# Patient Record
Sex: MALE | Race: WHITE | NOT HISPANIC OR LATINO | Employment: OTHER | URBAN - METROPOLITAN AREA
[De-identification: names, ages, dates, MRNs, and addresses within clinical notes are randomized per-mention and may not be internally consistent; named-entity substitution may affect disease eponyms.]

---

## 2017-01-18 ENCOUNTER — OFFICE VISIT (OUTPATIENT)
Dept: CARDIOLOGY | Facility: CLINIC | Age: 77
End: 2017-01-18

## 2017-01-18 VITALS
DIASTOLIC BLOOD PRESSURE: 93 MMHG | BODY MASS INDEX: 29.86 KG/M2 | SYSTOLIC BLOOD PRESSURE: 126 MMHG | WEIGHT: 208.6 LBS | HEIGHT: 70 IN | HEART RATE: 66 BPM

## 2017-01-18 DIAGNOSIS — E78.5 DYSLIPIDEMIA: ICD-10-CM

## 2017-01-18 DIAGNOSIS — R00.2 PALPITATIONS: Primary | ICD-10-CM

## 2017-01-18 DIAGNOSIS — I10 CHRONIC HYPERTENSION: ICD-10-CM

## 2017-01-18 PROCEDURE — 99213 OFFICE O/P EST LOW 20 MIN: CPT | Performed by: INTERNAL MEDICINE

## 2017-01-18 RX ORDER — PRAVASTATIN SODIUM 40 MG
TABLET ORAL DAILY
COMMUNITY
Start: 2016-12-30 | End: 2017-01-18 | Stop reason: SDUPTHER

## 2017-01-18 RX ORDER — DUTASTERIDE AND TAMSULOSIN HYDROCHLORIDE CAPSULES .5; .4 MG/1; MG/1
CAPSULE ORAL 3 TIMES WEEKLY
COMMUNITY
Start: 2016-10-26 | End: 2020-08-05

## 2017-01-18 RX ORDER — BISOPROLOL FUMARATE AND HYDROCHLOROTHIAZIDE 5; 6.25 MG/1; MG/1
1 TABLET ORAL DAILY
Qty: 90 TABLET | Refills: 2 | Status: SHIPPED | OUTPATIENT
Start: 2017-01-18 | End: 2017-10-27 | Stop reason: SDUPTHER

## 2017-01-18 RX ORDER — LISINOPRIL 20 MG/1
20 TABLET ORAL DAILY
Qty: 90 TABLET | Refills: 2 | Status: SHIPPED | OUTPATIENT
Start: 2017-01-18 | End: 2022-10-26

## 2017-01-18 RX ORDER — LISINOPRIL 20 MG/1
TABLET ORAL DAILY
COMMUNITY
Start: 2016-10-27 | End: 2017-01-18 | Stop reason: SDUPTHER

## 2017-01-18 RX ORDER — PRAVASTATIN SODIUM 40 MG
40 TABLET ORAL DAILY
Qty: 90 TABLET | Refills: 3 | Status: SHIPPED | OUTPATIENT
Start: 2017-01-18

## 2017-01-18 NOTE — PROGRESS NOTES
Subjective:     Encounter Date:01/18/2017      Patient ID: Casey Mathews is a 76 y.o. ., white male, retired , PhD, currently retired as a  for [employer], from [place], [place].    REFERRING/INTERNIST: Ulises Emanuel MD, [facility]  CURRENT PHYSICIAN:  Yakov Jansen MD  ORTHOPEDIC SURGEON: Fidencio Montejo MD     Chief Complaint:   Chief Complaint   Patient presents with   • Follow-up     Problem List:  1. Intermittent palpitations with associated tachypnea, dyspnea, weakness and fatigue:  a. Remote apparent similar symptoms with apparent “negative” stress test - data deficit, approximately 1990s.  b. Recent EKG demonstrating frequent PVCs, August 2011.  c. Acceptable echocardiogram demonstrating mild aortic valve sclerosis with mild AI and mild MR and mild right ventricular chamber enlargement, August 2011.   d. Recent abnormal acceptable echocardiogram with occasional PVCs and occasional unifocal and multifocal PVCs with occasional ventricular couplets and rare ventricular episodes with brief interventricular rhythm with acceptable Cardiolite GXT with exercise to 88% of predicted exercise capacity, LVEF (0.59), July 2014.  e. Residual class I symptoms on low-dose beta blocker drug therapy.  2. Chronic hypertension - probably essential.   3. Dyslipidemia.  4. Chronic lower tract obstructive symptoms - probable BPH.  5. Remote cataract extraction, 2001.  6. Recent left knee meniscal tear with arthroscopic surgery - data deficit, autumn 2015.    No Known Allergies      Current Outpatient Prescriptions:   •  bisoprolol-hydrochlorothiazide (ZIAC) 5-6.25 MG per tablet, TAKE ONE TABLET BY MOUTH ONCE DAILY, Disp: 90 tablet, Rfl: 2  •  dutasteride-tamsulosin (JANETTE) 0.5-0.4 MG capsule capsule, 3 (Three) Times a Week., Disp: , Rfl:   •  lisinopril (PRINIVIL,ZESTRIL) 20 MG tablet, Daily., Disp: , Rfl:   •  pravastatin (PRAVACHOL)  "40 MG tablet, Daily., Disp: , Rfl:     History of Present Illness  Patient denies any chest discomfort, shortness of breath, fatigue, edema, presyncope, syncope, palpitations.  He has lost weight since his last visit.  He has had his flu vaccination this year.  He is now retired as of November 2016 and is enjoying being able to travel and do things at leisure.    ROS   Obtained and otherwise negative except as outlined in problem list and HPI.    Procedures        Objective:       Vitals:    01/18/17 1412 01/18/17 1413   BP: 125/91 126/93   BP Location: Left arm Left arm   Patient Position: Sitting Standing   Pulse: 63 66   Weight:  208 lb 9.6 oz (94.6 kg)   Height:  70\" (177.8 cm)     Body mass index is 29.93 kg/(m^2).   Last weight January 2016 was 220 pounds    Physical Exam   Constitutional: He appears well-developed and well-nourished.   HENT:   Head: Normocephalic and atraumatic.   Mouth/Throat: Oropharynx is clear and moist.   Neck: Neck supple. No JVD present. Carotid bruit is not present. No thyromegaly present.   Cardiovascular: Normal rate and regular rhythm.  Exam reveals no gallop, no S3 and no friction rub.    No murmur heard.  Pulses:       Dorsalis pedis pulses are 2+ on the right side, and 2+ on the left side.        Posterior tibial pulses are 2+ on the right side, and 2+ on the left side.   Pulmonary/Chest: Effort normal and breath sounds normal.   Abdominal: Soft. He exhibits no mass. There is no hepatosplenomegaly. There is no tenderness.   Lymphadenopathy:     He has no cervical adenopathy.   Neurological: He is alert.   Skin: Skin is warm, dry and intact.       Lab Review: None to review          Assessment:       Overall continued acceptable course with no interim cardiopulmonary complaints with good functional status. We will defer additional diagnostic or therapeutic intervention from a cardiac perspective at this time. I feel his current regimen is producing excellent results, but " certainly if he is having progressive weight loss, he may need to down titrate on his bisoprolol dose to 2.5 mg daily.     Diagnosis Plan   1. Palpitations     2. Chronic hypertension     3. Dyslipidemia            Plan:         1. Patient to continue current medications and close follow up with the above providers.  2. Tentative cardiology follow up in 1 year or patient may return sooner PRN.  3. He has our 3-489 card and will fax his laboratory results to us.        Scribed for Audie Guevara MD by Krystal Valentin, ATIYA. 1/18/2017  2:23 PM    I, Audie Guevara MD, [facility], personally performed the services described in this documentation as scribed by the above named individual in my presence, and it is both accurate and complete. At 3:02 PM on 01/18/2017

## 2017-01-18 NOTE — LETTER
January 18, 2017     Yakov Jansen MD  [address] [place] [id number]    Patient: Casey Mathews   YOB: 1940   Date of Visit: 1/18/2017       Dear Dr. Inderjit MD:    Thank you for referring Casey Mathews to me for evaluation. Below are the relevant portions of my assessment and plan of care.    If you have questions, please do not hesitate to call me. I look forward to following Casey along with you.         Sincerely,        Audie Guevara MD        CC: No Recipients  Audie Guevara MD  1/18/2017  3:04 PM  Signed      Subjective:     Encounter Date:01/18/2017      Patient ID: Casey Mathews is a 76 y.o. ., white male, retired , PhD, currently retired as a  for [employer], from [place], [place].    REFERRING/INTERNIST: Ulises Emanuel MD, [facility]  CURRENT PHYSICIAN:  Yakov Jansen MD  ORTHOPEDIC SURGEON: Fidencio Montejo MD     Chief Complaint:   Chief Complaint   Patient presents with   • Follow-up     Problem List:  1. Intermittent palpitations with associated tachypnea, dyspnea, weakness and fatigue:  a. Remote apparent similar symptoms with apparent “negative” stress test - data deficit, approximately 1990s.  b. Recent EKG demonstrating frequent PVCs, August 2011.  c. Acceptable echocardiogram demonstrating mild aortic valve sclerosis with mild AI and mild MR and mild right ventricular chamber enlargement, August 2011.   d. Recent abnormal acceptable echocardiogram with occasional PVCs and occasional unifocal and multifocal PVCs with occasional ventricular couplets and rare ventricular episodes with brief interventricular rhythm with acceptable Cardiolite GXT with exercise to 88% of predicted exercise capacity, LVEF (0.59), July 2014.  e. Residual class I symptoms on low-dose beta blocker drug therapy.  2. Chronic hypertension - probably essential.   3. Dyslipidemia.  4. Chronic  "lower tract obstructive symptoms - probable BPH.  5. Remote cataract extraction, 2001.  6. Recent left knee meniscal tear with arthroscopic surgery - data deficit, autumn 2015.    No Known Allergies      Current Outpatient Prescriptions:   •  bisoprolol-hydrochlorothiazide (ZIAC) 5-6.25 MG per tablet, TAKE ONE TABLET BY MOUTH ONCE DAILY, Disp: 90 tablet, Rfl: 2  •  dutasteride-tamsulosin (JANETTE) 0.5-0.4 MG capsule capsule, 3 (Three) Times a Week., Disp: , Rfl:   •  lisinopril (PRINIVIL,ZESTRIL) 20 MG tablet, Daily., Disp: , Rfl:   •  pravastatin (PRAVACHOL) 40 MG tablet, Daily., Disp: , Rfl:     History of Present Illness  Patient denies any chest discomfort, shortness of breath, fatigue, edema, presyncope, syncope, palpitations.  He has lost weight since his last visit.  He has had his flu vaccination this year.  He is now retired as of November 2016 and is enjoying being able to travel and do things at leisure.    ROS   Obtained and otherwise negative except as outlined in problem list and HPI.    Procedures        Objective:       Vitals:    01/18/17 1412 01/18/17 1413   BP: 125/91 126/93   BP Location: Left arm Left arm   Patient Position: Sitting Standing   Pulse: 63 66   Weight:  208 lb 9.6 oz (94.6 kg)   Height:  70\" (177.8 cm)     Body mass index is 29.93 kg/(m^2).   Last weight January 2016 was 220 pounds    Physical Exam   Constitutional: He appears well-developed and well-nourished.   HENT:   Head: Normocephalic and atraumatic.   Mouth/Throat: Oropharynx is clear and moist.   Neck: Neck supple. No JVD present. Carotid bruit is not present. No thyromegaly present.   Cardiovascular: Normal rate and regular rhythm.  Exam reveals no gallop, no S3 and no friction rub.    No murmur heard.  Pulses:       Dorsalis pedis pulses are 2+ on the right side, and 2+ on the left side.        Posterior tibial pulses are 2+ on the right side, and 2+ on the left side.   Pulmonary/Chest: Effort normal and breath sounds " normal.   Abdominal: Soft. He exhibits no mass. There is no hepatosplenomegaly. There is no tenderness.   Lymphadenopathy:     He has no cervical adenopathy.   Neurological: He is alert.   Skin: Skin is warm, dry and intact.       Lab Review: None to review          Assessment:       Overall continued acceptable course with no interim cardiopulmonary complaints with good functional status. We will defer additional diagnostic or therapeutic intervention from a cardiac perspective at this time. I feel his current regimen is producing excellent results, but certainly if he is having progressive weight loss, he may need to down titrate on his bisoprolol dose to 2.5 mg daily.     Diagnosis Plan   1. Palpitations     2. Chronic hypertension     3. Dyslipidemia            Plan:         1. Patient to continue current medications and close follow up with the above providers.  2. Tentative cardiology follow up in 1 year or patient may return sooner PRN.  3. He has our 2-800 card and will fax his laboratory results to us.        Scribed for Audie Guevara MD by Krystal Valentin, APRN. 1/18/2017  2:23 PM    IAudie MD, [facility], personally performed the services described in this documentation as scribed by the above named individual in my presence, and it is both accurate and complete. At 3:02 PM on 01/18/2017

## 2017-10-27 RX ORDER — BISOPROLOL FUMARATE AND HYDROCHLOROTHIAZIDE 5; 6.25 MG/1; MG/1
TABLET ORAL
Qty: 90 TABLET | Refills: 2 | Status: SHIPPED | OUTPATIENT
Start: 2017-10-27 | End: 2018-01-24 | Stop reason: SDUPTHER

## 2018-01-24 ENCOUNTER — OFFICE VISIT (OUTPATIENT)
Dept: CARDIOLOGY | Facility: CLINIC | Age: 78
End: 2018-01-24

## 2018-01-24 VITALS
HEIGHT: 69 IN | DIASTOLIC BLOOD PRESSURE: 80 MMHG | BODY MASS INDEX: 28.58 KG/M2 | WEIGHT: 193 LBS | HEART RATE: 53 BPM | SYSTOLIC BLOOD PRESSURE: 138 MMHG

## 2018-01-24 DIAGNOSIS — E78.5 DYSLIPIDEMIA: ICD-10-CM

## 2018-01-24 DIAGNOSIS — R00.2 PALPITATIONS: Primary | ICD-10-CM

## 2018-01-24 DIAGNOSIS — I10 CHRONIC HYPERTENSION: ICD-10-CM

## 2018-01-24 PROCEDURE — 99214 OFFICE O/P EST MOD 30 MIN: CPT | Performed by: INTERNAL MEDICINE

## 2018-01-24 RX ORDER — BISOPROLOL FUMARATE AND HYDROCHLOROTHIAZIDE 5; 6.25 MG/1; MG/1
1 TABLET ORAL DAILY
Qty: 90 TABLET | Refills: 4 | Status: SHIPPED | OUTPATIENT
Start: 2018-01-24 | End: 2019-01-30 | Stop reason: ALTCHOICE

## 2018-01-24 NOTE — PROGRESS NOTES
Subjective:     Encounter Date:01/24/2018    Patient ID: Casey Mathews is a 77 y.o. , white male, retired , PhD, currently retired as a  for [employer], from [place], [place].     REFERRING/INTERNIST: Ulises Emanuel MD, [facility]  CURRENT PHYSICIAN:  Yakov Jansen MD  ORTHOPEDIC SURGEON: Fidencio Montejo MD     Chief Complaint:   Chief Complaint   Patient presents with   • Palpitations   • Hypertension     Problem List:  1. Intermittent palpitations with associated tachypnea, dyspnea, weakness and fatigue:  a. Remote apparent similar symptoms with apparent “negative” stress test - data deficit, approximately 1990s.  b. Remote EKG demonstrating frequent PVCs, August 2011.  c. Acceptable echocardiogram demonstrating mild aortic valve sclerosis with mild AI and mild MR and mild right ventricular chamber enlargement, August 2011.   d. Remote abnormal acceptable echocardiogram with occasional PVCs and occasional unifocal and multifocal PVCs with occasional ventricular couplets and rare ventricular episodes with brief interventricular rhythm with acceptable Cardiolite GXT with exercise to 88% of predicted exercise capacity, LVEF (0.59), July 2014.  e. Residual class I symptoms on low-dose beta blocker drug therapy.  2. Chronic hypertension - probably essential.   3. Dyslipidemia.  4. Chronic lower tract obstructive symptoms - probable BPH.  5. Remote cataract extraction, 2001.  6. Remote left knee meniscal tear with arthroscopic surgery - data deficit, autumn 2015.     No Known Allergies      Current Outpatient Prescriptions:   •  bisoprolol-hydrochlorothiazide (ZIAC) 5-6.25 MG per tablet, TAKE ONE TABLET BY MOUTH ONCE DAILY, Disp: 90 tablet, Rfl: 2  •  dutasteride-tamsulosin (JANETTE) 0.5-0.4 MG capsule capsule, 3 (Three) Times a Week., Disp: , Rfl:   •  lisinopril (PRINIVIL,ZESTRIL) 20 MG tablet, Take 1 tablet by mouth Daily.,  "Disp: 90 tablet, Rfl: 2  •  pravastatin (PRAVACHOL) 40 MG tablet, Take 1 tablet by mouth Daily., Disp: 90 tablet, Rfl: 3    HISTORY OF PRESENT ILLNESS: Patient returns for scheduled annual followup. He has been using [employer] to lose weight and has been eating a lot of vegetables; he hopes to win another 10 pounds.  He is congratulated on his weight loss.  He has been doing well since last being seen in our office.  He has remained active and has no symptoms from a cardiopulmonary perspective with his activities.  He notes that it is time for him to schedule an appointment with Dr. Jansen and will have lab work drawn at that time; he will request that we be provided the results.  He does not typically check his blood pressure at home, but he says that the parking was \"a little bit more stressful today.\"  He went to bed last night before the [employer] basketball game was over; he is an [employer] graduate but cheers for [employer] if they play each other.  He was glad that they won but is not sure about [employer] going to the opendorse tournament this year.  He had his flu shot in the fall. Patient otherwise denies chest pain, shortness of breath, PND, edema, palpitations, syncope or presyncope at this time.        Review of Systems   HENT: Positive for hearing loss.    Endocrine: Positive for polyuria.   Musculoskeletal: Positive for joint pain and joint swelling.   Genitourinary: Positive for frequency.   Allergic/Immunologic:        Allergic to shellfish      Obtained and otherwise negative except as outlined in problem list and HPI.    Procedures       Objective:       Vitals:    01/24/18 1043 01/24/18 1046 01/24/18 1109   BP: (!) 169/102 142/89 138/80   BP Location: Left arm Left arm Left arm   Patient Position: Sitting Standing Sitting   Pulse: 52 53    Weight: 87.5 kg (193 lb)     Height: 175.3 cm (69\")       Body mass index is 28.5 kg/(m^2).   Last weight:  208 lbs.    Physical Exam   Constitutional: He is oriented to " person, place, and time. He appears well-developed and well-nourished.   Neck: No JVD present. Carotid bruit is not present. No thyromegaly present.   Cardiovascular: Regular rhythm, S1 normal, S2 normal and normal heart sounds.  Exam reveals no gallop, no S3 and no friction rub.    No murmur heard.  Pulses:       Dorsalis pedis pulses are 2+ on the right side, and 2+ on the left side.        Posterior tibial pulses are 2+ on the right side, and 2+ on the left side.   Pulmonary/Chest: Effort normal and breath sounds normal. He has no wheezes. He has no rhonchi. He has no rales.   Abdominal: Soft. He exhibits no mass. There is no hepatosplenomegaly. There is no tenderness. There is no guarding.   Bowel sounds audible x4   Musculoskeletal: Normal range of motion. He exhibits no edema.   Lymphadenopathy:     He has no cervical adenopathy.   Neurological: He is alert and oriented to person, place, and time.   Skin: Skin is warm, dry and intact. No rash noted.   Vitals reviewed.        Lab Review: No recent laboratory results available for review today.      Lab Results   Component Value Date    BNP 67 06/24/2014           Assessment:   Overall continued acceptable course with no interim cardiopulmonary complaints with good functional status. We will defer additional diagnostic or therapeutic intervention from a cardiac perspective at this time.  Hopefully, we will be allowed to review any upcoming laboratory results with him by letter.       Diagnosis Plan   1. Palpitations  Acceptable control; continue current treatment   2. Chronic hypertension  Labile but overall acceptable control   3. Dyslipidemia  No recent data to review          Plan:         1. Patient to continue current medications and close follow up with the above providers.  2. Tentative cardiology follow up in January 2019, or patient may return sooner PRN.       Transcribed by Jennifer Clement for Dr. Audie Guevara at 11:04 AM on 01/24/2018    I,  Audie Guevara MD, [facility], personally performed the services described in this documentation as scribed by the above named individual in my presence, and it is both accurate and complete. At 11:17 AM on 01/24/2018

## 2019-01-30 ENCOUNTER — OFFICE VISIT (OUTPATIENT)
Dept: CARDIOLOGY | Facility: CLINIC | Age: 79
End: 2019-01-30

## 2019-01-30 VITALS
HEART RATE: 56 BPM | WEIGHT: 196 LBS | BODY MASS INDEX: 28.06 KG/M2 | DIASTOLIC BLOOD PRESSURE: 78 MMHG | HEIGHT: 70 IN | SYSTOLIC BLOOD PRESSURE: 132 MMHG

## 2019-01-30 DIAGNOSIS — C91.10 CLL (CHRONIC LYMPHOCYTIC LEUKEMIA) (HCC): ICD-10-CM

## 2019-01-30 DIAGNOSIS — I10 CHRONIC HYPERTENSION: ICD-10-CM

## 2019-01-30 DIAGNOSIS — R00.2 PALPITATIONS: Primary | ICD-10-CM

## 2019-01-30 DIAGNOSIS — E78.5 DYSLIPIDEMIA: ICD-10-CM

## 2019-01-30 PROCEDURE — 99214 OFFICE O/P EST MOD 30 MIN: CPT | Performed by: INTERNAL MEDICINE

## 2019-01-30 RX ORDER — CYCLOSPORINE 0.5 MG/ML
1 EMULSION OPHTHALMIC EVERY 12 HOURS
COMMUNITY
End: 2022-03-11

## 2019-01-30 RX ORDER — TAMSULOSIN HYDROCHLORIDE 0.4 MG/1
1 CAPSULE ORAL NIGHTLY
COMMUNITY

## 2019-01-30 RX ORDER — ACEBUTOLOL HYDROCHLORIDE 200 MG/1
200 CAPSULE ORAL DAILY
Qty: 30 CAPSULE | Refills: 6 | Status: SHIPPED | OUTPATIENT
Start: 2019-01-30 | End: 2019-02-22 | Stop reason: ALTCHOICE

## 2019-01-30 NOTE — PROGRESS NOTES
Subjective:     Encounter Date:01/30/2019    Patient ID: Casey Mathews is a 78 y.o.  white male, retired , PhD, currently retired as a  for [employer], from [place], [place].     REFERRING/INTERNIST: Ulises Emanuel MD, [facility]  CURRENT PHYSICIAN:  Yakov Jansen MD  ORTHOPEDIC SURGEON: Fidencio Montejo MD   HEMATOLOGIST:  Dae Marti MD, [facility]    Chief Complaint:   Chief Complaint   Patient presents with   • Palpitations     Problem List:  1. Intermittent palpitations with associated tachypnea, dyspnea, weakness and fatigue:  a. Remote apparent similar symptoms with apparent “negative” stress test - data deficit, approximately 1990s.  b. Remote EKG demonstrating frequent PVCs, August 2011.  c. Acceptable echocardiogram demonstrating mild aortic valve sclerosis with mild AI and mild MR and mild right ventricular chamber enlargement, August 2011.   d. Remote abnormal acceptable echocardiogram with occasional PVCs and occasional unifocal and multifocal PVCs with occasional ventricular couplets and rare ventricular episodes with brief interventricular rhythm with acceptable Cardiolite GXT with exercise to 88% of predicted exercise capacity, LVEF (0.59), July 2014.  e. Residual class I symptoms on low-dose beta blocker drug therapy.  2. Chronic hypertension - probably essential.   3. Dyslipidemia.  4. Chronic lower tract obstructive symptoms - probable BPH.  5. Remote cataract extraction, 2001.  6. Remote left knee meniscal tear with arthroscopic surgery - data deficit, autumn 2015.  7. Recent diagnosis of chronic lymphocytic leukemia, [facility] - data deficit, 2018.       No Known Allergies      Current Outpatient Medications:   •  bisoprolol-hydrochlorothiazide (ZIAC) 5-6.25 MG per tablet, Take 1 tablet by mouth Daily., Disp: 90 tablet, Rfl: 4  •  cycloSPORINE (RESTASIS) 0.05 % ophthalmic  "emulsion, 1 drop Daily., Disp: , Rfl:   •  dutasteride-tamsulosin (JANETTE) 0.5-0.4 MG capsule capsule, 3 (Three) Times a Week., Disp: , Rfl:   •  lisinopril (PRINIVIL,ZESTRIL) 20 MG tablet, Take 1 tablet by mouth Daily., Disp: 90 tablet, Rfl: 2  •  pravastatin (PRAVACHOL) 40 MG tablet, Take 1 tablet by mouth Daily., Disp: 90 tablet, Rfl: 3  •  tamsulosin (FLOMAX) 0.4 MG capsule 24 hr capsule, Take 1 capsule by mouth Every Night., Disp: , Rfl:     HISTORY OF PRESENT ILLNESS: Patient returns for scheduled annual followup. He has had testing done by a hematologist at [facility] and was found to have CLL; they are \"watching and waiting.\"  He has not had any symptoms and states that they \"may never have to treat it.\"  He notes that they are close to having a cure for the type of disease he has been diagnosed with.  Chest-wise, he has had no tightness or pressure, and he has not noticed his heart skipping.  His only complaint today is that his toes are cold; he is advised that beta-blockers can cause that, and he says he has never taken acebutolol.  He states he is a \"closet [employer] fan\" even though he graduated from [employer].  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  Patient otherwise denies chest pain, shortness of breath, PND, edema, palpitations, syncope or presyncope at this time.        Review of Systems   Skin: Positive for skin cancer.   Allergic/Immunologic:        Food allergies - shellfish      Obtained and otherwise negative except as outlined in problem list and HPI.    Procedures       Objective:       Vitals:    01/30/19 1137 01/30/19 1138 01/30/19 1151   BP: 147/89 132/90 132/78   BP Location: Left arm Left arm Left arm   Patient Position: Sitting Standing Sitting   Pulse: (!) 48 56    Weight: 88.9 kg (196 lb) 88.9 kg (196 lb)    Height: 177.8 cm (70\") 177.8 cm (70\")      Body mass index is 28.12 kg/m². "   Last weight:  193 lbs.    Physical Exam   Constitutional: He is oriented to person, place, and time. He appears well-developed and well-nourished.   Neck: No JVD present. Carotid bruit is not present. No thyromegaly present.   Cardiovascular: Regular rhythm, S1 normal and S2 normal. Exam reveals no gallop, no S3 and no friction rub.   Murmur heard.   Medium-pitched early systolic murmur is present with a grade of 1/6 at the lower left sternal border.  Pulses:       Carotid pulses are 2+ on the right side, and 2+ on the left side.       Radial pulses are 2+ on the right side, and 2+ on the left side.        Femoral pulses are 2+ on the right side, and 2+ on the left side.       Popliteal pulses are 2+ on the right side, and 2+ on the left side.        Dorsalis pedis pulses are 2+ on the right side, and 2+ on the left side.        Posterior tibial pulses are 2+ on the right side, and 2+ on the left side.   Pulmonary/Chest: Effort normal. He has decreased breath sounds. He has no wheezes. He has no rhonchi. He has no rales.   Abdominal: Soft. He exhibits no mass. There is no hepatosplenomegaly. There is no tenderness. There is no guarding.   Bowel sounds audible x4   Musculoskeletal: Normal range of motion. He exhibits no edema.   Lymphadenopathy:     He has no cervical adenopathy.   Neurological: He is alert and oriented to person, place, and time.   Skin: Skin is warm, dry and intact. No rash noted.   Vitals reviewed.        Lab Review: 12/12/2018  · eGFR - >60  · Differential - neutrophils 31.0, absolute neutrophils 4.12, lymphs 60.8, absolute lymphocytes 8.07, monocytes 5.5, absolute monocytes 0.73, eosinophils 2.0, absolute eosinophils 0.26, basophils 0.5, absolute basophils 0.06, imm gran automated 0.2, absolute imm gran automated 0.03  · Lactate dehydrogenase - 192  · CMP - sodium 142, potassium 3.9, chloride 101, CO2 - 29, anion gap 12, glucose 124, BUN 23, creatinine 0.98, calcium 9.4, bilirubin 0.9, albumin  4.3, total protein 6.9, alk phos 89, AST 18, ALT 7  · CBC - WBC 13.3, RBC 4.25, hemoglobin 14.3, hematocrit 44%, mean cell volume 103, mean cell hemoglobin 33.6, mean cell hemoglobin concentration 32.8, RDW SD 45.9, RDW CV 12.3, platelets 123, mean platelet volume 11.4, nucleated RBC 0, nucleated RBC Abs 0.00, auto neutrophil absolute 4.13      Lab Results   Component Value Date    BNP 67 06/24/2014           Assessment:   Overall continued acceptable course with no interim cardiopulmonary complaints with good functional status. We will defer additional diagnostic or therapeutic intervention from a cardiac perspective at this time.         Diagnosis Plan   1. Palpitations  Acceptable control; see below   2. Chronic hypertension  Acceptable control; see below   3. Dyslipidemia  No data to review   4. CLL (chronic lymphocytic leukemia) (CMS/[facility])  Continue hematology followup and monitoring every 3 months          Plan:         1. Patient to continue current medications and close follow up with the above providers with the following changes:   A. Set aside bisoprolol   B. Initiate a trial of acebutolol 200 mg daily and update us in 2-3 weeks as to whether he has less notable lower extremity digital coldness but continued acceptable control of palpitation; if not, we will refill bisoprolol  2. Tentative cardiology follow up in January 2020, or patient may return sooner PRN.    Transcribed by Jennifer Clement for Dr. Audie Guevara at 11:46 AM on 01/30/2019    IAudie MD, [facility], personally performed the services described in this documentation as scribed by the above named individual in my presence, and it is both accurate and complete. At 12:00 PM on 01/30/2019

## 2019-02-22 RX ORDER — BISOPROLOL FUMARATE AND HYDROCHLOROTHIAZIDE 5; 6.25 MG/1; MG/1
1 TABLET ORAL DAILY
Qty: 30 TABLET | Refills: 11 | Status: SHIPPED | OUTPATIENT
Start: 2019-02-22 | End: 2019-03-06 | Stop reason: ALTCHOICE

## 2019-02-22 NOTE — TELEPHONE ENCOUNTER
Mr Mathews sent in his B.P. Records from 02/19/2019 and 02/20/2019.  Krystal has adjusted his medication to D/C Acebutalol, Restart Bisprolol/HCTZ 5mg/6.25mg daily.  Prescription sent to pharmacy.  Pt advised of new prescription and to continue to monitor his bp & hr daily and send in results in 2 weeks.    We may need to up-titrate his ziac if bp stays elevated.

## 2019-03-06 RX ORDER — HYDROCHLOROTHIAZIDE 12.5 MG/1
12.5 CAPSULE, GELATIN COATED ORAL DAILY
Qty: 30 CAPSULE | Refills: 11 | Status: SHIPPED | OUTPATIENT
Start: 2019-03-06 | End: 2019-03-27 | Stop reason: SDUPTHER

## 2019-03-06 RX ORDER — BISOPROLOL FUMARATE 5 MG/1
5 TABLET, FILM COATED ORAL DAILY
Qty: 30 TABLET | Refills: 11 | Status: SHIPPED | OUTPATIENT
Start: 2019-03-06 | End: 2019-03-27 | Stop reason: SDUPTHER

## 2019-03-06 NOTE — TELEPHONE ENCOUNTER
Pt faxed in record of BP and HR. Record reviewed with Krystal Valentin NP and Medication was adjusted.    Pt notified and prescriptions sent to pharmacy    Pt will cont. To record BP & HR and call back in two weeks.

## 2019-03-26 NOTE — TELEPHONE ENCOUNTER
The last record of VSs 3/7 - 3/25 is with Pt taking Bisoprolol 5 mg and HCTZ 12.5 mg.  I reviewed them with Krystal and No changes need to made.  Pt feels there is not much difference to his last reading while on Bisoprolol 5 mg and HCTZ 6.25 mg. Pt is sending those records again since I could not find them scanned into Epic.

## 2019-03-27 RX ORDER — HYDROCHLOROTHIAZIDE 12.5 MG/1
12.5 CAPSULE, GELATIN COATED ORAL DAILY
Qty: 90 CAPSULE | Refills: 3 | Status: SHIPPED | OUTPATIENT
Start: 2019-03-27 | End: 2020-04-20

## 2019-03-27 RX ORDER — BISOPROLOL FUMARATE 5 MG/1
5 TABLET, FILM COATED ORAL DAILY
Qty: 90 TABLET | Refills: 3 | Status: SHIPPED | OUTPATIENT
Start: 2019-03-27 | End: 2020-04-20

## 2020-02-04 NOTE — PROGRESS NOTES
Subjective:     Encounter Date:02/05/2020    Patient ID: Casey Mathews is a 79 y.o.  white male, retired , PhD, currently retired as a  for [employer], from [place], [place].     REFERRING/INTERNIST: Ulises Emanuel MD, [facility]  CURRENT PHYSICIAN:  Yakov Jansen MD  ORTHOPEDIC SURGEON: Fidencio Montejo MD   HEMATOLOGIST:  Dae Marti MD, [facility]    Chief Complaint:   Chief Complaint   Patient presents with   • Palpitations     Problem List:  1. Intermittent palpitations with associated tachypnea, dyspnea, weakness and fatigue:  a. Remote apparent similar symptoms with apparent “negative” stress test - data deficit, approximately 1990s.  b. Remote EKG demonstrating frequent PVCs, August 2011.  c. Acceptable echocardiogram demonstrating mild aortic valve sclerosis with mild AI and mild MR and mild right ventricular chamber enlargement, August 2011.   d. Remote abnormal acceptable echocardiogram with occasional PVCs and occasional unifocal and multifocal PVCs with occasional ventricular couplets and rare ventricular episodes with brief interventricular rhythm with acceptable Cardiolite GXT with exercise to 88% of predicted exercise capacity, LVEF (0.59), July 2014.  e. Residual class I symptoms on low-dose beta blocker drug therapy.  2. Chronic hypertension - probably essential.   3. Dyslipidemia.  4. Chronic lower tract obstructive symptoms - probable BPH.  5. Remote cataract extraction, 2001.  6. Remote left knee meniscal tear with arthroscopic surgery - data deficit, autumn 2015.  7. Diagnosis of chronic lymphocytic leukemia, [facility] - data deficit, 2018.    Allergies   Allergen Reactions   • Shellfish-Derived Products GI Intolerance       Current Outpatient Medications:   •  bisoprolol (ZEBeta) 5 MG tablet, Take 1 tablet by mouth Daily., Disp: 90 tablet, Rfl: 3  •  cycloSPORINE  "(RESTASIS) 0.05 % ophthalmic emulsion, 1 drop Daily., Disp: , Rfl:   •  dutasteride (AVODART) 0.5 MG capsule, Take 0.5 mg by mouth Daily., Disp: , Rfl:   •  hydrochlorothiazide (MICROZIDE) 12.5 MG capsule, Take 1 capsule by mouth Daily., Disp: 90 capsule, Rfl: 3  •  lisinopril (PRINIVIL,ZESTRIL) 20 MG tablet, Take 1 tablet by mouth Daily., Disp: 90 tablet, Rfl: 2  •  pravastatin (PRAVACHOL) 40 MG tablet, Take 1 tablet by mouth Daily., Disp: 90 tablet, Rfl: 3  •  tamsulosin (FLOMAX) 0.4 MG capsule 24 hr capsule, Take 1 capsule by mouth Every Night., Disp: , Rfl:   •  dutasteride-tamsulosin (JANETTE) 0.5-0.4 MG capsule capsule, 3 (Three) Times a Week., Disp: , Rfl:     History of Present Illness: Patient returns for scheduled annual followup. He says he has \"had a good year.\"  He states that his hematologist has been watching his CLL carefully, and he has no symptoms and will have followup in 6 months.  He has had no palpitations and has not felt his heart skipping \"like it used to.\"  His heart rate is low today but is in sinus rhythm.  He has gone more to a plant based diet and is not eating much meat or dairy products.  His cholesterol levels have been good (data deficit); he has this checked once a year with his physician.  His blood pressure at home is \"pretty regular.\"  He thinks the 2-hour drive here today and \"white coat syndrome\" attributed to his increased blood pressure.  He says he has taken acebutolol in the past, but he does not think it worked as well for him as the bisoprolol and hydrochlorothiazide.  He is working out on a stationary bicycle and does \"high intense interval training\" and has no problems with that activity.  He has had no ER visits, hospitalizations, or surgeries since we last saw him in our office.  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  Patient otherwise denies chest pain, shortness of breath, PND, edema, " "palpitations, syncope or presyncope at this time.        Review of Systems   HENT: Positive for hearing loss.       Obtained and negative except as outlined in problem list and HPI.      ECG 12 Lead  Date/Time: 2/5/2020 11:04 AM  Performed by: Audie Guevara MD  Authorized by: Audie Guevara MD   Comparison: not compared with previous ECG   Previous ECG: no previous ECG available  Rhythm: sinus bradycardia  BPM: 38    Clinical impression: abnormal EKG  Comments:  ms   ms  QTc 386 ms               Objective:       Vitals:    02/05/20 1039 02/05/20 1042 02/05/20 1105   BP: 151/96 175/89 130/74   BP Location: Left arm Left arm Right arm   Patient Position: Sitting Standing Sitting   Pulse: (!) 39 (!) 39    Weight: 87.9 kg (193 lb 12.8 oz)     Height: 177.8 cm (70\")       Body mass index is 27.81 kg/m².   Last weight:  196 lbs.    Physical Exam   Constitutional: He is oriented to person, place, and time. He appears well-developed and well-nourished.   Neck: No JVD present. Carotid bruit is not present. No thyromegaly present.   Cardiovascular: Regular rhythm, S1 normal and S2 normal. Exam reveals no gallop, no S3 and no friction rub.   Murmur heard.   Medium-pitched early systolic murmur is present with a grade of 2/6 at the lower left sternal border.  Pulses:       Carotid pulses are 1+ on the right side, and 1+ on the left side.       Radial pulses are 1+ on the right side, and 1+ on the left side.        Femoral pulses are 1+ on the right side, and 1+ on the left side.       Popliteal pulses are 1+ on the right side, and 1+ on the left side.        Dorsalis pedis pulses are 1+ on the right side, and 1+ on the left side.        Posterior tibial pulses are 1+ on the right side, and 1+ on the left side.   Pulmonary/Chest: Effort normal. He has decreased breath sounds. He has no wheezes. He has no rhonchi. He has no rales.   Abdominal: Soft. He exhibits no mass. There is no hepatosplenomegaly. There is " no tenderness. There is no guarding.   Bowel sounds audible x4   Musculoskeletal: Normal range of motion. He exhibits no edema.   Lymphadenopathy:     He has no cervical adenopathy.   Neurological: He is alert and oriented to person, place, and time.   Skin: Skin is warm, dry and intact. No rash noted.   Vitals reviewed.        Lab Review:   01/08/2020:  · CMP - sodium 139, potassium 4.2, chloride 108, CO2 - 29, glucose 105, BUN 16, creatinine 0.98, calcium 9.3, bilirubin 1.1, albumin 4.2, protein 6.6, alk phos 76, AST 14, ALT 9  · CBC - hematocrit 43%, hemoglobin 14.3, WBC 14,000, mild macrocytosis, platelets 118,000, with lymphocytosis  · LDH - 181      Assessment:       Overall continued acceptable course with no new interim cardiopulmonary complaints with good functional status. We will defer additional diagnostic or therapeutic intervention from a cardiac perspective at this time.  Hopefully, we will be allowed to review any upcoming laboratory results with him by letter.  He is asymptomatic and very pleased with his current activity schedule and does not wish to change medications, if at all possible.  He states his home blood pressure readings have been nominal.     Diagnosis Plan   1. Dyslipidemia  No data to review; continue pravastatin   2. Palpitations  Well controlled; Continue current treatment.    3. Chronic hypertension  Labile readings but overall acceptable control; Continue current treatment.    4. CLL (chronic lymphocytic leukemia) (CMS/[facility])  Continue close followup and monitoring with [facility] hematologist          Plan:         1. Patient to continue current medications and close follow up with the above providers.  2. Tentative cardiology follow up in February 2021, or patient may return sooner PRN.    Transcribed by Jennifer Clement for Dr. Audie Guevara at 11:03 AM on 02/05/2020    IAudie MD, [facility], personally performed the services described in this documentation  as scribed by the above named individual in my presence, and it is both accurate and complete. At 11:49 AM on 02/05/2020

## 2020-02-05 ENCOUNTER — OFFICE VISIT (OUTPATIENT)
Dept: CARDIOLOGY | Facility: CLINIC | Age: 80
End: 2020-02-05

## 2020-02-05 VITALS
DIASTOLIC BLOOD PRESSURE: 74 MMHG | HEIGHT: 70 IN | BODY MASS INDEX: 27.75 KG/M2 | HEART RATE: 39 BPM | SYSTOLIC BLOOD PRESSURE: 130 MMHG | WEIGHT: 193.8 LBS

## 2020-02-05 DIAGNOSIS — R00.2 PALPITATIONS: ICD-10-CM

## 2020-02-05 DIAGNOSIS — I10 CHRONIC HYPERTENSION: ICD-10-CM

## 2020-02-05 DIAGNOSIS — E78.5 DYSLIPIDEMIA: Primary | ICD-10-CM

## 2020-02-05 DIAGNOSIS — C91.10 CLL (CHRONIC LYMPHOCYTIC LEUKEMIA) (HCC): ICD-10-CM

## 2020-02-05 PROCEDURE — 93000 ELECTROCARDIOGRAM COMPLETE: CPT | Performed by: INTERNAL MEDICINE

## 2020-02-05 PROCEDURE — 99214 OFFICE O/P EST MOD 30 MIN: CPT | Performed by: INTERNAL MEDICINE

## 2020-02-05 RX ORDER — DUTASTERIDE 0.5 MG/1
0.5 CAPSULE, LIQUID FILLED ORAL NIGHTLY
COMMUNITY

## 2020-04-20 RX ORDER — BISOPROLOL FUMARATE 5 MG/1
TABLET, FILM COATED ORAL
Qty: 90 TABLET | Refills: 3 | Status: SHIPPED | OUTPATIENT
Start: 2020-04-20 | End: 2021-06-14

## 2020-04-20 RX ORDER — HYDROCHLOROTHIAZIDE 12.5 MG/1
CAPSULE, GELATIN COATED ORAL
Qty: 90 CAPSULE | Refills: 1 | Status: SHIPPED | OUTPATIENT
Start: 2020-04-20 | End: 2020-12-15

## 2020-07-06 ENCOUNTER — TELEPHONE (OUTPATIENT)
Dept: CARDIOLOGY | Facility: CLINIC | Age: 80
End: 2020-07-06

## 2020-07-06 DIAGNOSIS — R00.2 PALPITATIONS: Primary | ICD-10-CM

## 2020-07-06 NOTE — TELEPHONE ENCOUNTER
Per KTS- pt needs to wear 14-day monitor and be worked into the office in 3-4 weeks.    Called pt and gave KTS recommendations above. Pt verbalizes understanding and agreeable to plan.    Monitor ordered to be mailed to patient and messaged scheduling about appointment.

## 2020-07-24 ENCOUNTER — TELEPHONE (OUTPATIENT)
Dept: CARDIOLOGY | Facility: CLINIC | Age: 80
End: 2020-07-24

## 2020-07-31 ENCOUNTER — TELEPHONE (OUTPATIENT)
Dept: CARDIOLOGY | Facility: CLINIC | Age: 80
End: 2020-07-31

## 2020-07-31 DIAGNOSIS — I47.29 VENTRICULAR TACHYCARDIA (PAROXYSMAL) (HCC): Primary | ICD-10-CM

## 2020-07-31 NOTE — TELEPHONE ENCOUNTER
Called patient regarding holter monitor results. Patient had episodes of V-tach. Per KTS- patient needs LHC and ECHO.    All questions answered at this time. Pt verbalizes understanding and agreeable to plan.

## 2020-08-03 PROBLEM — I47.20 VENTRICULAR TACHYCARDIA (PAROXYSMAL): Status: ACTIVE | Noted: 2020-08-03

## 2020-08-03 PROBLEM — I47.29 VENTRICULAR TACHYCARDIA (PAROXYSMAL): Status: ACTIVE | Noted: 2020-08-03

## 2020-08-04 ENCOUNTER — PREP FOR SURGERY (OUTPATIENT)
Dept: OTHER | Facility: HOSPITAL | Age: 80
End: 2020-08-04

## 2020-08-04 DIAGNOSIS — I47.29 VENTRICULAR TACHYCARDIA (PAROXYSMAL) (HCC): Primary | ICD-10-CM

## 2020-08-04 RX ORDER — SODIUM CHLORIDE 0.9 % (FLUSH) 0.9 %
3 SYRINGE (ML) INJECTION EVERY 12 HOURS SCHEDULED
Status: CANCELLED | OUTPATIENT
Start: 2020-08-04

## 2020-08-04 RX ORDER — SODIUM CHLORIDE 0.9 % (FLUSH) 0.9 %
10 SYRINGE (ML) INJECTION AS NEEDED
Status: CANCELLED | OUTPATIENT
Start: 2020-08-04

## 2020-08-04 RX ORDER — LIDOCAINE HYDROCHLORIDE 10 MG/ML
0.1 INJECTION, SOLUTION EPIDURAL; INFILTRATION; INTRACAUDAL; PERINEURAL ONCE AS NEEDED
Status: CANCELLED | OUTPATIENT
Start: 2020-08-04

## 2020-08-04 RX ORDER — NITROGLYCERIN 0.4 MG/1
0.4 TABLET SUBLINGUAL
Status: CANCELLED | OUTPATIENT
Start: 2020-08-04

## 2020-08-04 RX ORDER — ASPIRIN 81 MG/1
324 TABLET, CHEWABLE ORAL ONCE
Status: CANCELLED | OUTPATIENT
Start: 2020-08-04 | End: 2020-08-04

## 2020-08-04 RX ORDER — ASPIRIN 81 MG/1
81 TABLET ORAL DAILY
Status: CANCELLED | OUTPATIENT
Start: 2020-08-05

## 2020-08-04 RX ORDER — ONDANSETRON 2 MG/ML
4 INJECTION INTRAMUSCULAR; INTRAVENOUS EVERY 6 HOURS PRN
Status: CANCELLED | OUTPATIENT
Start: 2020-08-04

## 2020-08-05 ENCOUNTER — OFFICE VISIT (OUTPATIENT)
Dept: CARDIOLOGY | Facility: CLINIC | Age: 80
End: 2020-08-05

## 2020-08-05 VITALS
BODY MASS INDEX: 27.11 KG/M2 | WEIGHT: 183 LBS | DIASTOLIC BLOOD PRESSURE: 60 MMHG | HEART RATE: 68 BPM | TEMPERATURE: 97.1 F | OXYGEN SATURATION: 95 % | HEIGHT: 69 IN | SYSTOLIC BLOOD PRESSURE: 128 MMHG

## 2020-08-05 DIAGNOSIS — I47.29 VENTRICULAR TACHYCARDIA (PAROXYSMAL) (HCC): Primary | ICD-10-CM

## 2020-08-05 DIAGNOSIS — I10 CHRONIC HYPERTENSION: ICD-10-CM

## 2020-08-05 DIAGNOSIS — E78.5 DYSLIPIDEMIA: ICD-10-CM

## 2020-08-05 PROCEDURE — 99214 OFFICE O/P EST MOD 30 MIN: CPT | Performed by: INTERNAL MEDICINE

## 2020-08-05 NOTE — PROGRESS NOTES
Subjective:     Encounter Date:08/05/2020    Patient ID: Casey Mathews is a 79 y.o.  white male, retired , PhD, currently retired as a  for [employer], from [place], [place].     REFERRING/INTERNIST: Ulises Emanuel MD, [facility]  CURRENT PHYSICIAN:  Yakov Jansen MD  ORTHOPEDIC SURGEON: Fidencio Montejo MD   HEMATOLOGIST:  Dae Marti MD, [facility]    Chief Complaint:   Chief Complaint   Patient presents with   • f/u after wearing monitor       Problem List:  1. Intermittent palpitations with associated tachypnea, dyspnea, weakness and fatigue:  a. Remote apparent similar symptoms with apparent “negative” stress test - data deficit, approximately 1990s.  b. Remote EKG demonstrating frequent PVCs, August 2011.  c. Acceptable echocardiogram demonstrating mild aortic valve sclerosis with mild AI and mild MR and mild right ventricular chamber enlargement, August 2011.   d. Remote abnormal acceptable echocardiogram with occasional PVCs and occasional unifocal and multifocal PVCs with occasional ventricular couplets and rare ventricular episodes with brief interventricular rhythm with acceptable Cardiolite GXT with exercise to 88% of predicted exercise capacity, LVEF (0.59), July 2014.  e. ZioXT 7/10/2020-7/24/2020: Minimum heart rate 42 bpm, maximum 255 bpm with average 68 bpm.  35 VT runs occurred with the fastest lasting 11 beats at 255 bpm and the fastest interval was also the longest.  198 SVT runs occurred, isolated SVE's were frequent 6.5%, SVE couplets, SVE triplets were rare, isolated VE's were occasional 2.9%, VE couplets were rare, VE triplets were rare, ventricular bigeminy and trigeminy were present, with recommendations for heart catheterization  f. Residual class I symptoms on low-dose beta blocker drug therapy  2. Chronic hypertension - probably essential.   3. Dyslipidemia.  4. Chronic lower  tract obstructive symptoms - probable BPH.  5. Remote cataract extraction, 2001.  6. Remote left knee meniscal tear with arthroscopic surgery - data deficit, autumn 2015.  7. Diagnosis of chronic lymphocytic leukemia, [facility] - data deficit, 2018.  8. Recent mechanical fall with six right lung rib fractures and hemopneumothorax and abdominal bleeding with hospitalizations x2, [facility] with subsequent chest tube drainage x3 days completed 10 days ago- data deficit, July 2020    Allergies   Allergen Reactions   • Shellfish-Derived Products GI Intolerance       Current Outpatient Medications:   •  bisoprolol (ZEBeta) 5 MG tablet, TAKE ONE TABLET BY MOUTH DAILY, Disp: 90 tablet, Rfl: 3  •  cycloSPORINE (RESTASIS) 0.05 % ophthalmic emulsion, 1 drop Daily., Disp: , Rfl:   •  dutasteride (AVODART) 0.5 MG capsule, Take 0.5 mg by mouth Daily., Disp: , Rfl:   •  hydroCHLOROthiazide (MICROZIDE) 12.5 MG capsule, TAKE ONE CAPSULE BY MOUTH DAILY, Disp: 90 capsule, Rfl: 1  •  lisinopril (PRINIVIL,ZESTRIL) 20 MG tablet, Take 1 tablet by mouth Daily., Disp: 90 tablet, Rfl: 2  •  pravastatin (PRAVACHOL) 40 MG tablet, Take 1 tablet by mouth Daily., Disp: 90 tablet, Rfl: 3  •  tamsulosin (FLOMAX) 0.4 MG capsule 24 hr capsule, Take 1 capsule by mouth Every Night., Disp: , Rfl:     History of Present Illness: Patient returns for early for follow up after a 6-month hiatus. Since last visit, patient had worn a Holter monitor for 14 days which showed ventricular tachycardia. He was scheduled for a left heart catheterization on 08/03/2020 but the patient wanted to reschedule this until 8/12/2020 because he wanted to talk to Dr. Guevara regarding his monitor results.  The patient is unaware of any palpitations and does not have any chest pain, increased shortness of breath, edema, presyncope, or syncope.  The patient had a fall off of his porch and sustained 6 rib fractures on the right side.  He  "also had a hemothorax and had to have a chest tube placed for 3 days for increased shortness of breath last week; data deficit.  The patient states that he is still trying to recover from having low RBCs and is scheduled to see his oncologist at the end of this month.  The patient states that he had a lot of abdominal distention recently and fluid that has now resolved.  He wonders if the symptoms in concordance with his low RBCs were contributing to his ventricular tachycardia. Patient otherwise denies chest pain, shortness of breath, PND, edema, palpitations, syncope or presyncope at this time on limited activity.        Review of Systems   All other systems reviewed and are negative.     Obtained and negative except as outlined in problem list and HPI.    Procedures       Objective:       Vitals:    08/05/20 1243 08/05/20 1250   BP: 138/70 128/60   BP Location: Right arm Right arm   Patient Position: Sitting Standing   Pulse: 64 68   Temp: 97.1 °F (36.2 °C)    SpO2: 98% 95%   Weight: 83 kg (183 lb)    Height: 175.3 cm (69\")    Recheck blood pressure right arm sitting is 128/70  Body mass index is 27.02 kg/m².  Last weight: 193 lbs    Physical Exam   Constitutional: He is oriented to person, place, and time. He appears well-developed and well-nourished.   Neck: No JVD present. Carotid bruit is not present. No thyromegaly present.   Cardiovascular: Regular rhythm, S1 normal and S2 normal. Exam reveals no gallop, no S3 and no friction rub.   Murmur heard.   Medium-pitched harsh early systolic murmur is present with a grade of 2/6 at the lower left sternal border.  Pulses:       Dorsalis pedis pulses are 1+ on the right side, and 1+ on the left side.        Posterior tibial pulses are 1+ on the right side, and 1+ on the left side.   Pulmonary/Chest: Effort normal. He has decreased breath sounds. He has no wheezes. He has no rhonchi. He has no rales.   Abdominal: Soft. He exhibits no mass. There is no " hepatosplenomegaly. There is no tenderness. There is no guarding.   Musculoskeletal: Normal range of motion. He exhibits no edema.   Lymphadenopathy:     He has no cervical adenopathy.   Neurological: He is alert and oriented to person, place, and time.   Skin: Skin is warm, dry and intact. No rash noted.   Vitals reviewed.        Lab Review: No recent laboratory studies to review today.    Holter Monitor:  Monitor placed on patient on 7/3/2020 . The patient was monitored for 14 days. Indications for this exam include palpitations. Minimum heart rate 42 bpm, maximum 255 bpm with average 68 bpm.  35 VT runs occurred with the fastest lasting 11 beats at 255 bpm and the fastest interval was also the longest.  198 SVT runs occurred, isolated SVE's were frequent 6.5%, SVE couplets, SVE triplets were rare, isolated VE's were occasional 2.9%, VE couplets were rare, VE triplets were rare, ventricular bigeminy and trigeminy were present, with recommendations for heart catheterization        Assessment:       Patient wore a recent event monitor and was found to have 35 ventricular tachycardia runs and is scheduled to have a left heart catheterization 8/12/2020; procedure, risks, and complications discussed with the patient and he is agreeable to proceed.  He will have an echocardiogram the same day.  Of note, the patient has a shellfish allergy with GI intolerances. He will need to undergo EP consultation as an outpatient in [place] if he does not have significant myocardial, valvular, or ischemic heart disease.     Diagnosis Plan   1. Ventricular tachycardia (paroxysmal) (CMS/HCC)  Patient wore a recent event monitor and was found to have 35 ventricular tachycardia runs and is scheduled to have a left heart catheterization 8/12/2020; procedure, risks, and complications discussed with the patient and he is agreeable to proceed.  He will have an echocardiogram the same day.   2. Chronic hypertension  Controlled, continue  current cardiac medication   3. Dyslipidemia  No new labs to review, continue pravastatin          Plan:         1. Patient to continue current medications and close follow up with the above providers with the following upcoming orders:  A. LHC +/- CBI on 08/12/2020  B. Echocardiogram on 08/12/2020  2. Tentative cardiology follow up in December 2020 or patient may return sooner PRN.    Scribed for Audie Guevara MD by Krystal Valentin, APRN. 8/5/2020  13:18    I, Audie Guevara MD, [facility], personally performed the services described in this documentation as scribed by the above named individual in my presence, and it is both accurate and complete. At 1:49 PM on 08/05/2020

## 2020-08-05 NOTE — H&P (VIEW-ONLY)
Subjective:     Encounter Date:08/05/2020    Patient ID: Casey Mathews is a 79 y.o.  white male, retired , PhD, currently retired as a  for [employer], from [place], [place].     REFERRING/INTERNIST: Ulises Emanuel MD, [facility]  CURRENT PHYSICIAN:  Yakov Jansen MD  ORTHOPEDIC SURGEON: Fidencio Montejo MD   HEMATOLOGIST:  Dae Marti MD, [facility]    Chief Complaint:   Chief Complaint   Patient presents with   • f/u after wearing monitor       Problem List:  1. Intermittent palpitations with associated tachypnea, dyspnea, weakness and fatigue:  a. Remote apparent similar symptoms with apparent “negative” stress test - data deficit, approximately 1990s.  b. Remote EKG demonstrating frequent PVCs, August 2011.  c. Acceptable echocardiogram demonstrating mild aortic valve sclerosis with mild AI and mild MR and mild right ventricular chamber enlargement, August 2011.   d. Remote abnormal acceptable echocardiogram with occasional PVCs and occasional unifocal and multifocal PVCs with occasional ventricular couplets and rare ventricular episodes with brief interventricular rhythm with acceptable Cardiolite GXT with exercise to 88% of predicted exercise capacity, LVEF (0.59), July 2014.  e. ZioXT 7/10/2020-7/24/2020: Minimum heart rate 42 bpm, maximum 255 bpm with average 68 bpm.  35 VT runs occurred with the fastest lasting 11 beats at 255 bpm and the fastest interval was also the longest.  198 SVT runs occurred, isolated SVE's were frequent 6.5%, SVE couplets, SVE triplets were rare, isolated VE's were occasional 2.9%, VE couplets were rare, VE triplets were rare, ventricular bigeminy and trigeminy were present, with recommendations for heart catheterization  f. Residual class I symptoms on low-dose beta blocker drug therapy  2. Chronic hypertension - probably essential.   3. Dyslipidemia.  4. Chronic lower  tract obstructive symptoms - probable BPH.  5. Remote cataract extraction, 2001.  6. Remote left knee meniscal tear with arthroscopic surgery - data deficit, autumn 2015.  7. Diagnosis of chronic lymphocytic leukemia, [facility] - data deficit, 2018.  8. Recent mechanical fall with six right lung rib fractures and hemopneumothorax and abdominal bleeding with hospitalizations x2, [facility] with subsequent chest tube drainage x3 days completed 10 days ago- data deficit, July 2020    Allergies   Allergen Reactions   • Shellfish-Derived Products GI Intolerance       Current Outpatient Medications:   •  bisoprolol (ZEBeta) 5 MG tablet, TAKE ONE TABLET BY MOUTH DAILY, Disp: 90 tablet, Rfl: 3  •  cycloSPORINE (RESTASIS) 0.05 % ophthalmic emulsion, 1 drop Daily., Disp: , Rfl:   •  dutasteride (AVODART) 0.5 MG capsule, Take 0.5 mg by mouth Daily., Disp: , Rfl:   •  hydroCHLOROthiazide (MICROZIDE) 12.5 MG capsule, TAKE ONE CAPSULE BY MOUTH DAILY, Disp: 90 capsule, Rfl: 1  •  lisinopril (PRINIVIL,ZESTRIL) 20 MG tablet, Take 1 tablet by mouth Daily., Disp: 90 tablet, Rfl: 2  •  pravastatin (PRAVACHOL) 40 MG tablet, Take 1 tablet by mouth Daily., Disp: 90 tablet, Rfl: 3  •  tamsulosin (FLOMAX) 0.4 MG capsule 24 hr capsule, Take 1 capsule by mouth Every Night., Disp: , Rfl:     History of Present Illness: Patient returns for early for follow up after a 6-month hiatus. Since last visit, patient had worn a Holter monitor for 14 days which showed ventricular tachycardia. He was scheduled for a left heart catheterization on 08/03/2020 but the patient wanted to reschedule this until 8/12/2020 because he wanted to talk to Dr. Guevara regarding his monitor results.  The patient is unaware of any palpitations and does not have any chest pain, increased shortness of breath, edema, presyncope, or syncope.  The patient had a fall off of his porch and sustained 6 rib fractures on the right side.  He  "also had a hemothorax and had to have a chest tube placed for 3 days for increased shortness of breath last week; data deficit.  The patient states that he is still trying to recover from having low RBCs and is scheduled to see his oncologist at the end of this month.  The patient states that he had a lot of abdominal distention recently and fluid that has now resolved.  He wonders if the symptoms in concordance with his low RBCs were contributing to his ventricular tachycardia. Patient otherwise denies chest pain, shortness of breath, PND, edema, palpitations, syncope or presyncope at this time on limited activity.        Review of Systems   All other systems reviewed and are negative.     Obtained and negative except as outlined in problem list and HPI.    Procedures       Objective:       Vitals:    08/05/20 1243 08/05/20 1250   BP: 138/70 128/60   BP Location: Right arm Right arm   Patient Position: Sitting Standing   Pulse: 64 68   Temp: 97.1 °F (36.2 °C)    SpO2: 98% 95%   Weight: 83 kg (183 lb)    Height: 175.3 cm (69\")    Recheck blood pressure right arm sitting is 128/70  Body mass index is 27.02 kg/m².  Last weight: 193 lbs    Physical Exam   Constitutional: He is oriented to person, place, and time. He appears well-developed and well-nourished.   Neck: No JVD present. Carotid bruit is not present. No thyromegaly present.   Cardiovascular: Regular rhythm, S1 normal and S2 normal. Exam reveals no gallop, no S3 and no friction rub.   Murmur heard.   Medium-pitched harsh early systolic murmur is present with a grade of 2/6 at the lower left sternal border.  Pulses:       Dorsalis pedis pulses are 1+ on the right side, and 1+ on the left side.        Posterior tibial pulses are 1+ on the right side, and 1+ on the left side.   Pulmonary/Chest: Effort normal. He has decreased breath sounds. He has no wheezes. He has no rhonchi. He has no rales.   Abdominal: Soft. He exhibits no mass. There is no " hepatosplenomegaly. There is no tenderness. There is no guarding.   Musculoskeletal: Normal range of motion. He exhibits no edema.   Lymphadenopathy:     He has no cervical adenopathy.   Neurological: He is alert and oriented to person, place, and time.   Skin: Skin is warm, dry and intact. No rash noted.   Vitals reviewed.        Lab Review: No recent laboratory studies to review today.    Holter Monitor:  Monitor placed on patient on 7/3/2020 . The patient was monitored for 14 days. Indications for this exam include palpitations. Minimum heart rate 42 bpm, maximum 255 bpm with average 68 bpm.  35 VT runs occurred with the fastest lasting 11 beats at 255 bpm and the fastest interval was also the longest.  198 SVT runs occurred, isolated SVE's were frequent 6.5%, SVE couplets, SVE triplets were rare, isolated VE's were occasional 2.9%, VE couplets were rare, VE triplets were rare, ventricular bigeminy and trigeminy were present, with recommendations for heart catheterization        Assessment:       Patient wore a recent event monitor and was found to have 35 ventricular tachycardia runs and is scheduled to have a left heart catheterization 8/12/2020; procedure, risks, and complications discussed with the patient and he is agreeable to proceed.  He will have an echocardiogram the same day.  Of note, the patient has a shellfish allergy with GI intolerances. He will need to undergo EP consultation as an outpatient in [place] if he does not have significant myocardial, valvular, or ischemic heart disease.     Diagnosis Plan   1. Ventricular tachycardia (paroxysmal) (CMS/HCC)  Patient wore a recent event monitor and was found to have 35 ventricular tachycardia runs and is scheduled to have a left heart catheterization 8/12/2020; procedure, risks, and complications discussed with the patient and he is agreeable to proceed.  He will have an echocardiogram the same day.   2. Chronic hypertension  Controlled, continue  current cardiac medication   3. Dyslipidemia  No new labs to review, continue pravastatin          Plan:         1. Patient to continue current medications and close follow up with the above providers with the following upcoming orders:  A. LHC +/- CBI on 08/12/2020  B. Echocardiogram on 08/12/2020  2. Tentative cardiology follow up in December 2020 or patient may return sooner PRN.    Scribed for Audie Guevara MD by Krystal Valentin, APRN. 8/5/2020  13:18    I, Audie Guevara MD, [facility], personally performed the services described in this documentation as scribed by the above named individual in my presence, and it is both accurate and complete. At 1:49 PM on 08/05/2020

## 2020-08-12 ENCOUNTER — HOSPITAL ENCOUNTER (OUTPATIENT)
Facility: HOSPITAL | Age: 80
Discharge: HOME | End: 2020-08-12
Attending: INTERNAL MEDICINE | Admitting: INTERNAL MEDICINE

## 2020-08-12 ENCOUNTER — APPOINTMENT (OUTPATIENT)
Dept: CARDIOLOGY | Facility: HOSPITAL | Age: 80
End: 2020-08-12

## 2020-08-12 VITALS
SYSTOLIC BLOOD PRESSURE: 111 MMHG | TEMPERATURE: 97.6 F | HEART RATE: 49 BPM | OXYGEN SATURATION: 96 % | BODY MASS INDEX: 26.66 KG/M2 | RESPIRATION RATE: 16 BRPM | HEIGHT: 69 IN | WEIGHT: 180 LBS | DIASTOLIC BLOOD PRESSURE: 67 MMHG

## 2020-08-12 DIAGNOSIS — I47.29 VENTRICULAR TACHYCARDIA (PAROXYSMAL) (HCC): ICD-10-CM

## 2020-08-12 LAB
ANION GAP SERPL CALCULATED.3IONS-SCNC: 10 MMOL/L (ref 5–15)
BH CV ECHO MEAS - AO ROOT AREA (BSA CORRECTED): 1.9
BH CV ECHO MEAS - AO ROOT AREA: 11 CM^2
BH CV ECHO MEAS - AO ROOT DIAM: 3.7 CM
BH CV ECHO MEAS - BSA(HAYCOCK): 2 M^2
BH CV ECHO MEAS - BSA: 2 M^2
BH CV ECHO MEAS - BZI_BMI: 26.7 KILOGRAMS/M^2
BH CV ECHO MEAS - BZI_METRIC_HEIGHT: 175 CM
BH CV ECHO MEAS - BZI_METRIC_WEIGHT: 81.6 KG
BH CV ECHO MEAS - EDV(CUBED): 151.8 ML
BH CV ECHO MEAS - EDV(MOD-SP2): 129 ML
BH CV ECHO MEAS - EDV(MOD-SP4): 123 ML
BH CV ECHO MEAS - EDV(TEICH): 137.4 ML
BH CV ECHO MEAS - EF(CUBED): 76.6 %
BH CV ECHO MEAS - EF(MOD-BP): 61 %
BH CV ECHO MEAS - EF(MOD-SP2): 60.8 %
BH CV ECHO MEAS - EF(MOD-SP4): 61.2 %
BH CV ECHO MEAS - EF(TEICH): 68.2 %
BH CV ECHO MEAS - ESV(CUBED): 35.5 ML
BH CV ECHO MEAS - ESV(MOD-SP2): 50.6 ML
BH CV ECHO MEAS - ESV(MOD-SP4): 47.7 ML
BH CV ECHO MEAS - ESV(TEICH): 43.7 ML
BH CV ECHO MEAS - FS: 38.4 %
BH CV ECHO MEAS - IVS/LVPW: 0.79
BH CV ECHO MEAS - IVSD: 1 CM
BH CV ECHO MEAS - LA DIMENSION: 4.3 CM
BH CV ECHO MEAS - LA/AO: 1.2
BH CV ECHO MEAS - LAD MAJOR: 5.1 CM
BH CV ECHO MEAS - LAT PEAK E' VEL: 5.9 CM/SEC
BH CV ECHO MEAS - LATERAL E/E' RATIO: 9.5
BH CV ECHO MEAS - LV DIASTOLIC VOL/BSA (35-75): 62.3 ML/M^2
BH CV ECHO MEAS - LV MASS(C)D: 248.1 GRAMS
BH CV ECHO MEAS - LV MASS(C)DI: 125.7 GRAMS/M^2
BH CV ECHO MEAS - LV MAX PG: 5.6 MMHG
BH CV ECHO MEAS - LV MEAN PG: 2.5 MMHG
BH CV ECHO MEAS - LV SYSTOLIC VOL/BSA (12-30): 24.2 ML/M^2
BH CV ECHO MEAS - LV V1 MAX: 118.7 CM/SEC
BH CV ECHO MEAS - LV V1 MEAN: 69.7 CM/SEC
BH CV ECHO MEAS - LV V1 VTI: 33.9 CM
BH CV ECHO MEAS - LVIDD: 5.3 CM
BH CV ECHO MEAS - LVIDS: 3.3 CM
BH CV ECHO MEAS - LVLD AP2: 8.4 CM
BH CV ECHO MEAS - LVLD AP4: 8 CM
BH CV ECHO MEAS - LVLS AP2: 7.2 CM
BH CV ECHO MEAS - LVLS AP4: 7.9 CM
BH CV ECHO MEAS - LVOT AREA (M): 2 CM^2
BH CV ECHO MEAS - LVOT AREA: 1.9 CM^2
BH CV ECHO MEAS - LVOT DIAM: 1.6 CM
BH CV ECHO MEAS - LVPWD: 1.1 CM
BH CV ECHO MEAS - MED PEAK E' VEL: 7.6 CM/SEC
BH CV ECHO MEAS - MEDIAL E/E' RATIO: 7.3
BH CV ECHO MEAS - MV A MAX VEL: 74.6 CM/SEC
BH CV ECHO MEAS - MV DEC SLOPE: 292.9 CM/SEC^2
BH CV ECHO MEAS - MV DEC TIME: 0.19 SEC
BH CV ECHO MEAS - MV E MAX VEL: 55.7 CM/SEC
BH CV ECHO MEAS - MV E/A: 0.75
BH CV ECHO MEAS - PA ACC TIME: 0.11 SEC
BH CV ECHO MEAS - PA PR(ACCEL): 31.5 MMHG
BH CV ECHO MEAS - SI(CUBED): 59 ML/M^2
BH CV ECHO MEAS - SI(LVOT): 32.6 ML/M^2
BH CV ECHO MEAS - SI(MOD-SP2): 39.7 ML/M^2
BH CV ECHO MEAS - SI(MOD-SP4): 38.2 ML/M^2
BH CV ECHO MEAS - SI(TEICH): 47.5 ML/M^2
BH CV ECHO MEAS - SV(CUBED): 116.3 ML
BH CV ECHO MEAS - SV(LVOT): 64.2 ML
BH CV ECHO MEAS - SV(MOD-SP2): 78.4 ML
BH CV ECHO MEAS - SV(MOD-SP4): 75.3 ML
BH CV ECHO MEAS - SV(TEICH): 93.7 ML
BH CV ECHO MEAS - TAPSE (>1.6): 2.9 CM
BH CV ECHO MEASUREMENTS AVERAGE E/E' RATIO: 8.25
BH CV VAS BP LEFT ARM: NORMAL MMHG
BH CV XLRA - RV BASE: 3.8 CM
BH CV XLRA - RV LENGTH: 5.9 CM
BH CV XLRA - RV MID: 2.6 CM
BH CV XLRA - TDI S': 16.4 CM/SEC
BUN SERPL-MCNC: 15 MG/DL (ref 8–23)
BUN/CREAT SERPL: 17.4 (ref 7–25)
CALCIUM SPEC-SCNC: 8.7 MG/DL (ref 8.6–10.5)
CHLORIDE SERPL-SCNC: 102 MMOL/L (ref 98–107)
CHOLEST SERPL-MCNC: 119 MG/DL (ref 0–200)
CO2 SERPL-SCNC: 27 MMOL/L (ref 22–29)
CREAT SERPL-MCNC: 0.86 MG/DL (ref 0.76–1.27)
DEPRECATED RDW RBC AUTO: 50.5 FL (ref 37–54)
ERYTHROCYTE [DISTWIDTH] IN BLOOD BY AUTOMATED COUNT: 13.4 % (ref 12.3–15.4)
GFR SERPL CREATININE-BSD FRML MDRD: 86 ML/MIN/1.73
GLUCOSE SERPL-MCNC: 115 MG/DL (ref 65–99)
HCT VFR BLD AUTO: 40.1 % (ref 37.5–51)
HDLC SERPL-MCNC: 52 MG/DL (ref 40–60)
HGB BLD-MCNC: 12.5 G/DL (ref 13–17.7)
LDLC SERPL CALC-MCNC: 55 MG/DL (ref 0–100)
LDLC/HDLC SERPL: 1.06 {RATIO}
LEFT ATRIUM VOLUME INDEX: 23.4 ML/M^2
LEFT ATRIUM VOLUME: 46.2 ML
LV EF 2D ECHO EST: 65 %
MAXIMAL PREDICTED HEART RATE: 142 BPM
MCH RBC QN AUTO: 32 PG (ref 26.6–33)
MCHC RBC AUTO-ENTMCNC: 31.2 G/DL (ref 31.5–35.7)
MCV RBC AUTO: 102.6 FL (ref 79–97)
PLATELET # BLD AUTO: 170 10*3/MM3 (ref 140–450)
PMV BLD AUTO: 10.5 FL (ref 6–12)
POTASSIUM SERPL-SCNC: 3.8 MMOL/L (ref 3.5–5.2)
RBC # BLD AUTO: 3.91 10*6/MM3 (ref 4.14–5.8)
SODIUM SERPL-SCNC: 139 MMOL/L (ref 136–145)
STRESS TARGET HR: 121 BPM
TRIGL SERPL-MCNC: 60 MG/DL (ref 0–150)
VLDLC SERPL-MCNC: 12 MG/DL
WBC # BLD AUTO: 16.37 10*3/MM3 (ref 3.4–10.8)

## 2020-08-12 PROCEDURE — 0 IOPAMIDOL PER 1 ML: Performed by: INTERNAL MEDICINE

## 2020-08-12 PROCEDURE — C1894 INTRO/SHEATH, NON-LASER: HCPCS | Performed by: INTERNAL MEDICINE

## 2020-08-12 PROCEDURE — 25010000002 MIDAZOLAM PER 1 MG: Performed by: INTERNAL MEDICINE

## 2020-08-12 PROCEDURE — C1760 CLOSURE DEV, VASC: HCPCS | Performed by: INTERNAL MEDICINE

## 2020-08-12 PROCEDURE — 99214 OFFICE O/P EST MOD 30 MIN: CPT | Performed by: NURSE PRACTITIONER

## 2020-08-12 PROCEDURE — 36415 COLL VENOUS BLD VENIPUNCTURE: CPT

## 2020-08-12 PROCEDURE — 93458 L HRT ARTERY/VENTRICLE ANGIO: CPT | Performed by: INTERNAL MEDICINE

## 2020-08-12 PROCEDURE — 80061 LIPID PANEL: CPT | Performed by: NURSE PRACTITIONER

## 2020-08-12 PROCEDURE — C1887 CATHETER, GUIDING: HCPCS | Performed by: INTERNAL MEDICINE

## 2020-08-12 PROCEDURE — 85347 COAGULATION TIME ACTIVATED: CPT

## 2020-08-12 PROCEDURE — 93571 IV DOP VEL&/PRESS C FLO 1ST: CPT | Performed by: INTERNAL MEDICINE

## 2020-08-12 PROCEDURE — 25010000002 HEPARIN (PORCINE) PER 1000 UNITS: Performed by: INTERNAL MEDICINE

## 2020-08-12 PROCEDURE — 80048 BASIC METABOLIC PNL TOTAL CA: CPT | Performed by: NURSE PRACTITIONER

## 2020-08-12 PROCEDURE — 25010000002 FENTANYL CITRATE (PF) 100 MCG/2ML SOLUTION: Performed by: INTERNAL MEDICINE

## 2020-08-12 PROCEDURE — 85027 COMPLETE CBC AUTOMATED: CPT | Performed by: NURSE PRACTITIONER

## 2020-08-12 PROCEDURE — C1769 GUIDE WIRE: HCPCS | Performed by: INTERNAL MEDICINE

## 2020-08-12 PROCEDURE — 93005 ELECTROCARDIOGRAM TRACING: CPT | Performed by: NURSE PRACTITIONER

## 2020-08-12 PROCEDURE — 93306 TTE W/DOPPLER COMPLETE: CPT | Performed by: INTERNAL MEDICINE

## 2020-08-12 PROCEDURE — 93306 TTE W/DOPPLER COMPLETE: CPT

## 2020-08-12 RX ORDER — NITROGLYCERIN 0.4 MG/1
0.4 TABLET SUBLINGUAL
Status: DISCONTINUED | OUTPATIENT
Start: 2020-08-12 | End: 2020-08-12 | Stop reason: HOSPADM

## 2020-08-12 RX ORDER — ASPIRIN 81 MG/1
324 TABLET, CHEWABLE ORAL ONCE
Status: COMPLETED | OUTPATIENT
Start: 2020-08-12 | End: 2020-08-12

## 2020-08-12 RX ORDER — ASPIRIN 81 MG/1
81 TABLET ORAL DAILY
Qty: 90 TABLET | Refills: 3 | Status: SHIPPED | OUTPATIENT
Start: 2020-08-12 | End: 2022-03-11

## 2020-08-12 RX ORDER — MIDAZOLAM HYDROCHLORIDE 1 MG/ML
INJECTION INTRAMUSCULAR; INTRAVENOUS AS NEEDED
Status: DISCONTINUED | OUTPATIENT
Start: 2020-08-12 | End: 2020-08-12 | Stop reason: HOSPADM

## 2020-08-12 RX ORDER — HEPARIN SODIUM 1000 [USP'U]/ML
INJECTION, SOLUTION INTRAVENOUS; SUBCUTANEOUS AS NEEDED
Status: DISCONTINUED | OUTPATIENT
Start: 2020-08-12 | End: 2020-08-12 | Stop reason: HOSPADM

## 2020-08-12 RX ORDER — LIDOCAINE HYDROCHLORIDE 10 MG/ML
INJECTION, SOLUTION EPIDURAL; INFILTRATION; INTRACAUDAL; PERINEURAL AS NEEDED
Status: DISCONTINUED | OUTPATIENT
Start: 2020-08-12 | End: 2020-08-12 | Stop reason: HOSPADM

## 2020-08-12 RX ORDER — ASPIRIN 81 MG/1
81 TABLET ORAL DAILY
Status: DISCONTINUED | OUTPATIENT
Start: 2020-08-13 | End: 2020-08-12 | Stop reason: HOSPADM

## 2020-08-12 RX ORDER — SODIUM CHLORIDE 0.9 % (FLUSH) 0.9 %
3 SYRINGE (ML) INJECTION EVERY 12 HOURS SCHEDULED
Status: DISCONTINUED | OUTPATIENT
Start: 2020-08-12 | End: 2020-08-12 | Stop reason: HOSPADM

## 2020-08-12 RX ORDER — LIDOCAINE HYDROCHLORIDE 10 MG/ML
0.1 INJECTION, SOLUTION EPIDURAL; INFILTRATION; INTRACAUDAL; PERINEURAL ONCE AS NEEDED
Status: DISCONTINUED | OUTPATIENT
Start: 2020-08-12 | End: 2020-08-12 | Stop reason: HOSPADM

## 2020-08-12 RX ORDER — SODIUM CHLORIDE 9 MG/ML
125 INJECTION, SOLUTION INTRAVENOUS CONTINUOUS
Status: ACTIVE | OUTPATIENT
Start: 2020-08-12 | End: 2020-08-12

## 2020-08-12 RX ORDER — FENTANYL CITRATE 50 UG/ML
INJECTION, SOLUTION INTRAMUSCULAR; INTRAVENOUS AS NEEDED
Status: DISCONTINUED | OUTPATIENT
Start: 2020-08-12 | End: 2020-08-12 | Stop reason: HOSPADM

## 2020-08-12 RX ORDER — SODIUM CHLORIDE 0.9 % (FLUSH) 0.9 %
10 SYRINGE (ML) INJECTION AS NEEDED
Status: DISCONTINUED | OUTPATIENT
Start: 2020-08-12 | End: 2020-08-12 | Stop reason: HOSPADM

## 2020-08-12 RX ORDER — ONDANSETRON 2 MG/ML
4 INJECTION INTRAMUSCULAR; INTRAVENOUS EVERY 6 HOURS PRN
Status: DISCONTINUED | OUTPATIENT
Start: 2020-08-12 | End: 2020-08-12 | Stop reason: HOSPADM

## 2020-08-12 RX ADMIN — ASPIRIN 324 MG: 81 TABLET, CHEWABLE ORAL at 07:36

## 2020-08-12 RX ADMIN — SODIUM CHLORIDE 125 ML/HR: 9 INJECTION, SOLUTION INTRAVENOUS at 10:52

## 2020-08-12 NOTE — DISCHARGE INSTR - ACTIVITY
Dr. Robert would like for you to follow up with electrophysiology. They will either see you today during your visit or will call you to schedule a follow up in the office.

## 2020-08-12 NOTE — INTERVAL H&P NOTE
CC:  Nonsustained Ventricular Tachycardia      HPI Update:  Mr Casey Mathews is a 79 year old white male who presents today for elective left heart catheterization for documented nonsustained ventricular tachycardia.  Patient recently underwent a unexplained fall breaking several ribs with hemothorax requiring blood transfusion.  As result patient was discharged on cardiac monitor related to ectopy seen on telemetry during admission per patient's report.  Patient's two-week monitor revealed 35 episodes of nonsustained VT up to 11 beats in duration.  It was recommended that patient undergo left heart catheterization and assessment of his coronary anatomy.  Patient has a history of PVCs currently followed by Dr. Guevara treated with bisoprolol with good success over the past 3 to 4 years.  Patient denies chest pain, palpitations, dizziness, or presyncope.  Patient denies history of coronary artery disease.  Patient denies family history of coronary artery disease.  Patient noted to have negative stress test and acceptable echocardiogram in the past worked up before he is premature ventricular contractions.  Patient's preoperative lab evaluation reviewed and acceptable with noted leukocytosis.  Patient denies recent infections or signs of fever, chills, sweats, or cough.  Patient has negative COVID screening documented within the last 72 hours.  Patient's right Peter's test assessed and normal.  Patient scheduled for transthoracic echocardiogram prior to discharge today following catheterization.    Constitutional: No fevers or chills, no recent weight gain or weight loss or fatigue  Eyes: No visual loss, blurred vision, double vision, yellow sclerae.  ENT: No headaches, hearing loss, vertigo, congestion or sore throat.   Cardiovascular: Per HPI  Respiratory: No cough or wheezing, no sputum production, no hematemesis   Gastrointestinal: No abdominal pain, no nausea, vomiting, constipation, diarrhea, melena.  "  Genitourinary: No dysuria, hematuria or increased frequency.  Musculoskeletal:  No gait disturbance, weakness or joint pain or stiffness  Integumentary: No rashes, urticaria, ulcers or sores.   Neurological: No headache, dizziness, syncope, paralysis, ataxia, no prior CVA/TIA  Psychiatric: No anxiety, or depression  Endocrine: No diaphoresis, cold or heat intolerance. No polyuria or polydipsia.   Hematologic/Lymphatic: No anemia, abnormal bruising or bleeding. No history of DVT/PE.    /84 (BP Location: Left arm, Patient Position: Lying)   Pulse (!) 44   Temp 97.6 °F (36.4 °C) (Temporal)   Ht 175.3 cm (69\")   Wt 81.8 kg (180 lb 5.4 oz)   SpO2 98%   BMI 26.63 kg/m²     Telemetry:  SB 45 bpm    Lab Results   Component Value Date    WBC 16.37 (H) 08/12/2020    HGB 12.5 (L) 08/12/2020    HCT 40.1 08/12/2020    .6 (H) 08/12/2020     08/12/2020     Lab Results   Component Value Date    GLUCOSE 115 (H) 08/12/2020    CALCIUM 8.7 08/12/2020     08/12/2020    K 3.8 08/12/2020    CO2 27.0 08/12/2020     08/12/2020    BUN 15 08/12/2020    CREATININE 0.86 08/12/2020    EGFRIFNONA 86 08/12/2020    BCR 17.4 08/12/2020    ANIONGAP 10.0 08/12/2020     GEN: Well nourished, well-developed, no acute distress  HEENT: Normocephalic, atraumatic, moist mucous membranes  NECK: Supple, no JVD, no thyromegaly, no lymphadenopathy  CARD: Regular rate and rhythm, normal S1 & S2 are present.  No murmur, gallop or rubs are appreciated.  LUNGS: Clear to auscultation bilateraly, normal respiratory effort  ABDOMEN: Soft, nontender, normal bowel sounds  EXTREMITIES: No gross deformities, no clubbing, cyanosis.  No Edema   SKIN: Warm, dry  NEURO: No focal deficits, alert and oriented x 3  PSYCHIATRIC: Normal affect and mood, appropriate use of semantics and logic.      Electronically signed by ATIYA Almanzar, 08/12/20, 8:16 AM.    Plan:  Proceed with left heart catheterization to rule out obstructive " coronary disease as etiology of SVT.    The risks, benefits, and alternative options of cardiac catheterization were discussed with the patient.  The risks include death, MI, stroke, infection, vascular injury requiring surgical repair and/or blood transfusion, coronary dissection, renal dysfunction/failure, allergic reaction, emergent CABG.  If PCI is needed there is a 1-2% risk of emergent CABG.  The patient is agreeable for cardiac catheterization, possible PCI or CABG. Plan is to proceed with cardiac catheterization and possible PCI.     Miller Robert M.D., F.A.C.C.  Interventional Cardiology  08/12/20  12:08

## 2020-08-12 NOTE — CONSULTS
Cardiac Electrophysiology In Patient Consultation        [facility]     Consultation      PATIENT NAME:  Casey Mathews    :  1940 AGE: 79 y.o.     Date of Admission:  2020  Date of Consultation:  2020    Primary Cardiologist:  Dr. Ismael MD    Subjective      REASON FOR CONSULT: Nonsustained Ventricular Tachycardia     CHIEF COMPLAINT: Nonsustained Ventricular Tachycardia     Problem List:    1. Nonsustained Ventricular Tachycardia / Premature Ventricular Contractions  a. History of intermittent palpitations with associated tachypnea, dyspnea, weakness, fatigue  b. Remote apparent similar symptoms with apparent “negative” stress test - data deficit, approximately .  c. Remote EKG demonstrating frequent PVCs, 2011.  d. Acceptable echocardiogram demonstrating mild aortic valve sclerosis with mild AI and mild MR and mild right ventricular chamber enlargement, 2011.   e. Remote abnormal acceptable echocardiogram with occasional PVCs and occasional unifocal and multifocal PVCs with occasional ventricular couplets and rare ventricular episodes with brief interventricular rhythm with acceptable Cardiolite GXT with exercise to 88% of predicted exercise capacity, LVEF (0.59), 2014.  f. ZioXT 7/10/2020-2020: Minimum heart rate 42 bpm, maximum 255 bpm with average 68 bpm.  35 VT runs occurred with the fastest lasting 11 beats at 255 bpm and the fastest interval was also the longest.  198 SVT runs occurred, isolated SVE's were frequent 6.5%, SVE couplets, SVE triplets were rare, isolated VE's were occasional 2.9%, VE couplets were rare, VE triplets were rare, ventricular bigeminy and trigeminy were present, with recommendations for heart catheterization  g. LHC 2020 per Dr. Robert revealing moderate nonobstructive CAD, EF 50-60%  h. ECHO 2020 EF 56-60%, mild VHD, LA 4.3 cm  2. Essential Hypertension    3. Dyslipidemia.  4. Chronic lymphocytic leukemia, [facility] - data deficit, 2018.      HISTORY OF PRESENT ILLNESS:  Mr. Casey Mathews is a 79-year-old white male seen in EP evaluation today for documented nonsustained ventricular tachycardia.  Upon evaluation patient is status post left heart catheterization per Dr. Robert earlier today without complaints.  Patient was noted to have documented nonsustained VT with subsequent left heart catheterization revealing moderate nonobstructive disease and normal LV function.  Patient reports a long history of PVCs followed by Dr. Guevara that have been well controlled on bisoprolol therapy over the past 4 years.  Patient reports that he recently had a mechanical fall while working on his house resulting in multiple cracked ribs with a right hemothorax.  The hemothorax was initially maintained with observation however subsequently led to a hospital admission 3 weeks later with chest tube placement and reported initial drainage of 2 L of blood.  Patient states that after his fall he was placed on a cardiac monitor for which he wore for 14 days prior to having his chest tube placed.  Patient reports that during that time he was struggling with pain and taking pain medication with increasing shortness of breath with very minimal activity.  His monitor was reviewed revealing episodes of nonsustained ventricular tachycardia and frequent PVCs with an isolated 2.9% ventricular ectopy burden and less than 1% NSVT burden up to 11 beats in duration.  Patient also noted to have a 6.5% supraventricular ectopy burden with frequent PACs as well.  Patient denies symptoms of chest pain, palpitations, dizziness, presyncope, or syncope prior to or during these events.  He did complain of shortness of breath with his hemothorax that immediately resolved with chest tube placement. Patient denies a history of sleep apnea but admits to concerning symptoms of snoring and daytime  sleepiness with daily naps.  Patient noted with ongoing asymptomatic bradycardia with a resting heart rate 30-50 bpm.        PAST MEDICAL HISTORY  Past Medical History:   Diagnosis Date   • Broken ribs 06/2020   • Chronic hypertension 12/20/2016     probably essential.   • CLL (chronic lymphocytic leukemia) (CMS/[facility]) 04/2018   • Dyslipidemia 12/20/2016   • Hyperlipidemia    • Palpitations 12/20/2016    Intermittent palpitations with associated tachypnea, dyspnea, weakness and fatigue: Remote apparent similar symptoms with apparent “negative” stress test - data deficit, approximately 1990s. Recent EKG demonstrating frequent PVCs, August 2011. Acceptable echocardiogram demonstrating mild aortic valve sclerosis with mild AI and mild MR and mild right ventricular chamber enlargement, August 2011.  R   • Personal history of abnormal accumulation of fluid in abdomen        SURGICAL HISTORY   has a past surgical history that includes Cataract extraction and Knee surgery.     SOCIAL HISTORY  Social History     Socioeconomic History   • Marital status:      Spouse name: Not on file   • Number of children: Not on file   • Years of education: Not on file   • Highest education level: Not on file   Tobacco Use   • Smoking status: Never Smoker   • Smokeless tobacco: Never Used   Substance and Sexual Activity   • Alcohol use: No   • Drug use: No   • Sexual activity: Defer       FAMILY HISTORY  family history includes Alzheimer's disease in his father; Lupus in his mother.     MEDICATIONS  Prior to Admission medications    Medication Sig Start Date End Date Taking? Authorizing Provider   bisoprolol (ZEBeta) 5 MG tablet TAKE ONE TABLET BY MOUTH DAILY 4/20/20  Yes Audie Guevara MD   cycloSPORINE (RESTASIS) 0.05 % ophthalmic emulsion Administer 1 drop to both eyes Every 12 (Twelve) Hours.   Yes Callie Allen MD   dutasteride (AVODART) 0.5 MG capsule Take 0.5 mg by mouth Every Night.   Yes Callie Allen MD  "  hydroCHLOROthiazide (MICROZIDE) 12.5 MG capsule TAKE ONE CAPSULE BY MOUTH DAILY  Patient taking differently: Take 12.5 mg by mouth Every Night. 4/20/20  Yes Audie Guevara MD   lisinopril (PRINIVIL,ZESTRIL) 20 MG tablet Take 1 tablet by mouth Daily.  Patient taking differently: Take 20 mg by mouth Every Night. 1/18/17  Yes Audie Guevara MD   pravastatin (PRAVACHOL) 40 MG tablet Take 1 tablet by mouth Daily.  Patient taking differently: Take 40 mg by mouth Every Night. 1/18/17  Yes Audie Guveara MD   tamsulosin (FLOMAX) 0.4 MG capsule 24 hr capsule Take 1 capsule by mouth Every Night.   Yes ProviderCallie MD   aspirin (aspirin) 81 MG EC tablet Take 1 tablet by mouth Daily. 8/12/20   Miller Robert MD       ALLERGIES  Allergies   Allergen Reactions   • Shellfish-Derived Products GI Intolerance       REVIEW OF SYSTEMS  Constitutional: No fevers or chills, no recent weight gain or weight loss or fatigue  Eyes: No visual loss, blurred vision, double vision, yellow sclerae.  ENT: No headaches, hearing loss, vertigo, congestion or sore throat.   Cardiovascular: Per HPI  Respiratory: No cough or wheezing, no sputum production, no hematemesis   Gastrointestinal: No abdominal pain, no nausea, vomiting, constipation, diarrhea, melena.   Genitourinary: No dysuria, hematuria or increased frequency.  Musculoskeletal:  No gait disturbance, weakness or joint pain or stiffness  Integumentary: No rashes, urticaria, ulcers or sores.   Neurological: No headache, dizziness, syncope, paralysis, ataxia, no prior CVA/TIA  Psychiatric: No anxiety, or depression  Endocrine: No diaphoresis, cold or heat intolerance. No polyuria or polydipsia.   Hematologic/Lymphatic: No anemia, abnormal bruising or bleeding. No history of DVT/PE.      Objective     VITAL SIGNS: /67   Pulse (!) 49   Temp 97.6 °F (36.4 °C) (Temporal)   Resp 16   Ht 175.3 cm (69\")   Wt 81.6 kg (180 lb)   SpO2 96%   BMI 26.58 kg/m²        ADMIT " "WEIGHT:  81.8 kg (180 lb 5.4 oz)  BMI: Body mass index is 26.58 kg/m².    Admission Weight: 81.8 kg (180 lb 5.4 oz)     PHYSICAL EXAM  General appearance: Awake, alert, cooperative  Head: Normocephalic, without obvious abnormality, atraumatic  Eyes: Conjunctivae/corneas clear, EOMs intact  Neck: no adenopathy, no carotid bruit, no JVD and thyroid: not enlarged  Lungs: clear to auscultation bilaterally and no rhonchi or crackles\", ' symmetric  Heart: regular rate and rhythm, S1, S2 normal, no murmur, click, rub or gallop  Abdomen: Soft, non-tender, bowel sounds normal,  no organomegaly  Extremities: extremities normal, atraumatic, no cyanosis or edema  Skin: Skin color, turgor normal, no rashes or lesions  Neurologic: Grossly normal     CBC: Results from last 7 days   Lab Units 08/12/20  0703   WBC 10*3/mm3 16.37*   HEMOGLOBIN g/dL 12.5*   HEMATOCRIT % 40.1   MCV fL 102.6*   PLATELETS 10*3/mm3 170         BMP:  Results from last 7 days   Lab Units 08/12/20  0703   POTASSIUM mmol/L 3.8   CHLORIDE mmol/L 102   CO2 mmol/L 27.0   BUN mg/dL 15   CREATININE mg/dL 0.86   GLUCOSE mg/dL 115*   CALCIUM mg/dL 8.7     Lipid Panel:  Total Cholesterol   Date Value Ref Range Status   08/12/2020 119 0 - 200 mg/dL Final     Triglycerides   Date Value Ref Range Status   08/12/2020 60 0 - 150 mg/dL Final     HDL Cholesterol   Date Value Ref Range Status   08/12/2020 52 40 - 60 mg/dL Final     TELEMETRY: SB 40-50 bpm    Assessment & Plan     Mr. Mathews is a 79-year-old white male seen in EP evaluation today for documented nonsustained VT with subsequent left heart catheterization revealing moderate nonobstructive CAD and normal LV function. Patient's cardiac monitor demonstrating NSVT was reviewed with Dr. Ghosh in the hospital today.  In light of patient's nonobstructive CAD, structural/functional normal heart, and concerning symptoms for obstructive sleep apnea it is recommended that patient have sleep medicine referral to evaluate " for obstructive sleep apnea suspected to be the etiology of his arrhythmias.  At this time patient wishes to talk with Dr. Guevara prior to proceeding with any additional testing and follow-up with Dr. Ghosh in the near future.  We will schedule patient appointment on August 28,2020 which is our next clinic day in [place].  There are no new medication orders with today's visit.  Continue all current medications.  Thank you for the consultation.  We will see patient back in follow-up or sooner as needed.      Electronically signed by ATIYA Almanzar, 08/12/20, 3:19 PM.      This note was completed using a voice transcription system. Every effort was made to ensure accuracy. However, inadvertent computerized transcription errors may be present.

## 2020-08-13 LAB — ACT BLD: 219 SECONDS (ref 82–152)

## 2020-08-28 ENCOUNTER — OFFICE VISIT (OUTPATIENT)
Dept: CARDIOLOGY | Facility: CLINIC | Age: 80
End: 2020-08-28

## 2020-08-28 VITALS
OXYGEN SATURATION: 99 % | SYSTOLIC BLOOD PRESSURE: 112 MMHG | BODY MASS INDEX: 26.2 KG/M2 | DIASTOLIC BLOOD PRESSURE: 58 MMHG | WEIGHT: 183 LBS | HEIGHT: 70 IN | TEMPERATURE: 97.5 F | HEART RATE: 50 BPM

## 2020-08-28 DIAGNOSIS — I47.29 VENTRICULAR TACHYCARDIA (PAROXYSMAL) (HCC): Primary | ICD-10-CM

## 2020-08-28 PROCEDURE — 99214 OFFICE O/P EST MOD 30 MIN: CPT | Performed by: INTERNAL MEDICINE

## 2020-08-28 NOTE — PROGRESS NOTES
Cardiac Electrophysiology Outpatient Follow Up Note            [facility] at [facility]    Follow Up Office Visit      Casey Mathews  [id number]  08/28/2020  [unfilled]  [unfilled]    Primary Care Physician: Yakov Jansen MD    Referred By: No ref. provider found    Subjective     Chief Complaint:   Chief Complaint   Patient presents with   • VENTRICULAR TACHYCARDIA       History of Present Illness:   Mr. Casey Mathews is a 79 y.o. male who presents to my electrophysiology clinic for follow up of nonsustained VT found incidentally on an ambulatory heart rhythm monitor.  Cardiac catheterization was performed and was unremarkable.  He feels well.  He does not have any difficulty with somnolence during the day he does not wake up at night at the time.  He is excellent quality of sleep.  He sleeps starting at about 10 PM and his wife is up until about 2 AM and she notes to him that he never stops breathing at night.    He has no palpitations no chest pain nausea vomit fevers or chills..      Review of Systems:   Constitutional: No fevers or chills, no recent weight gain or weight loss or fatigue  Eyes: No visual loss, blurred vision, double vision, yellow sclerae.  ENT: No headaches, hearing loss, vertigo, congestion or sore throat.   Cardiovascular: Per HPI  Respiratory: No cough or wheezing, no sputum production, no hematemesis   Gastrointestinal: No abdominal pain, no nausea, vomiting, constipation, diarrhea, melena.   Genitourinary: No dysuria, hematuria or increased frequency.  Musculoskeletal:  No gait disturbance, weakness or joint pain or stiffness  Integumentary: No rashes, urticaria, ulcers or sores.   Neurological: No headache, dizziness, syncope, paralysis, ataxia, no prior CVA/TIA  Psychiatric: No anxiety, or depression  Endocrine: No diaphoresis, cold or heat intolerance. No polyuria or polydipsia.   Hematologic/Lymphatic: No anemia, abnormal bruising  or bleeding. No history of DVT/PE.      Past Medical History:   Past Medical History:   Diagnosis Date   • Broken ribs 06/2020   • Chronic hypertension 12/20/2016     probably essential.   • CLL (chronic lymphocytic leukemia) (CMS/[facility]) 04/2018   • Dyslipidemia 12/20/2016   • Hyperlipidemia    • Palpitations 12/20/2016    Intermittent palpitations with associated tachypnea, dyspnea, weakness and fatigue: Remote apparent similar symptoms with apparent “negative” stress test - data deficit, approximately 1990s. Recent EKG demonstrating frequent PVCs, August 2011. Acceptable echocardiogram demonstrating mild aortic valve sclerosis with mild AI and mild MR and mild right ventricular chamber enlargement, August 2011.  R   • Personal history of abnormal accumulation of fluid in abdomen        Past Surgical History:   Past Surgical History:   Procedure Laterality Date   • CARDIAC CATHETERIZATION N/A 8/12/2020    Procedure: Left Heart Cath;  Surgeon: Miller Robert MD;  Location: [facility] CATH INVASIVE LOCATION;  Service: Cardiology;  Laterality: N/A;   • CATARACT EXTRACTION      2001   • KNEE SURGERY      Recent left knee meniscal tear with arthroscopic surgery - data deficit, autumn 2015       Family History:   Family History   Problem Relation Age of Onset   • Lupus Mother    • Alzheimer's disease Father        Social History:   Social History     Socioeconomic History   • Marital status:      Spouse name: Not on file   • Number of children: Not on file   • Years of education: Not on file   • Highest education level: Not on file   Tobacco Use   • Smoking status: Never Smoker   • Smokeless tobacco: Never Used   Substance and Sexual Activity   • Alcohol use: No   • Drug use: No   • Sexual activity: Defer       Medications:     Current Outpatient Medications:   •  aspirin (aspirin) 81 MG EC tablet, Take 1 tablet by mouth Daily., Disp: 90 tablet, Rfl: 3  •  bisoprolol (ZEBeta) 5 MG tablet, TAKE ONE TABLET BY MOUTH  "DAILY, Disp: 90 tablet, Rfl: 3  •  cycloSPORINE (RESTASIS) 0.05 % ophthalmic emulsion, Administer 1 drop to both eyes Every 12 (Twelve) Hours., Disp: , Rfl:   •  dutasteride (AVODART) 0.5 MG capsule, Take 0.5 mg by mouth Every Night., Disp: , Rfl:   •  hydroCHLOROthiazide (MICROZIDE) 12.5 MG capsule, TAKE ONE CAPSULE BY MOUTH DAILY (Patient taking differently: Take 12.5 mg by mouth Every Night.), Disp: 90 capsule, Rfl: 1  •  lisinopril (PRINIVIL,ZESTRIL) 20 MG tablet, Take 1 tablet by mouth Daily. (Patient taking differently: Take 20 mg by mouth Every Night.), Disp: 90 tablet, Rfl: 2  •  pravastatin (PRAVACHOL) 40 MG tablet, Take 1 tablet by mouth Daily. (Patient taking differently: Take 40 mg by mouth Every Night.), Disp: 90 tablet, Rfl: 3  •  tamsulosin (FLOMAX) 0.4 MG capsule 24 hr capsule, Take 1 capsule by mouth Every Night., Disp: , Rfl:     Allergies:   Allergies   Allergen Reactions   • Shellfish-Derived Products GI Intolerance       Objective     Physical Exam:  Vital Signs:   Vitals:    08/28/20 1243   BP: 112/58   BP Location: Right arm   Patient Position: Sitting   Pulse: 50   Temp: 97.5 °F (36.4 °C)   SpO2: 99%   Weight: 83 kg (183 lb)   Height: 177.8 cm (70\")     GEN: Well nourished, well-developed, no acute distress  HEENT: Normocephalic, atraumatic, moist mucous membranes  NECK: Supple, no JVD, no thyromegaly, no lymphadenopathy  CARD: Regular rate and rhythm, normal S1 & S2 are present.  No murmur, gallop or rubs are appreciated.  LUNGS: Clear to auscultation bilateraly, normal respiratory effort  ABDOMEN: Soft, nontender, normal bowel sounds  EXTREMITIES: No gross deformities, no clubbing, cyanosis.  Edema none  SKIN: Warm, dry  NEURO: No focal deficits, alert and oriented x 3  PSYCHIATRIC: Normal affect and mood, appropriate use of semantics and logic.        Lab Results   Component Value Date    GLUCOSE 115 (H) 08/12/2020    CALCIUM 8.7 08/12/2020     08/12/2020    K 3.8 08/12/2020    CO2 " 27.0 08/12/2020     08/12/2020    BUN 15 08/12/2020    CREATININE 0.86 08/12/2020    EGFRIFNONA 86 08/12/2020    BCR 17.4 08/12/2020    ANIONGAP 10.0 08/12/2020     Lab Results   Component Value Date    WBC 16.37 (H) 08/12/2020    HGB 12.5 (L) 08/12/2020    HCT 40.1 08/12/2020    .6 (H) 08/12/2020     08/12/2020     No results found for: INR, PROTIME  No results found for: TSH, I9ACWCL, X5DROOO, THYROIDAB    Cardiac Testing:     I personally viewed and interpreted the patient's EKG/Telemetry/lab data    Procedures    Tobacco Cessation: N/A  Obstructive Sleep Apnea Screening: N/A    Assessment & Plan      Pleasant gentleman 79 years of age with a structurally normal heart by cardiac catheterization nonsustained monomorphic VT which occurred in the setting of recovery from chest wall trauma.  He has no symptoms or signs that are concerning for sleep apnea.  After lengthy conversation today I think is safe enough to defer sleep apnea work-up.  We will repeat a Shellie heart rhythm monitor and if this is unremarkable our work-up will and there.  He follows with Dr. Guevara and we will see him down the road.  I will reserve follow-up for me for new symptoms.  There are no diagnoses linked to this encounter.        Follow Up:       Thank you for allowing me to participate in the care of your patient. Please to not hesitate to contact me with additional questions or concerns.        Virgil Ghosh, DO, FACC, [facility]  Cardiac Electrophysiologist

## 2020-09-18 ENCOUNTER — TELEPHONE (OUTPATIENT)
Dept: CARDIOLOGY | Facility: CLINIC | Age: 80
End: 2020-09-18

## 2020-10-14 ENCOUNTER — TELEPHONE (OUTPATIENT)
Dept: CARDIOLOGY | Facility: CLINIC | Age: 80
End: 2020-10-14

## 2020-10-14 NOTE — TELEPHONE ENCOUNTER
----- Message from Virgil Ghosh DO sent at 10/13/2020  6:25 PM EDT -----  I saw this gentleman because of nonsustained VT on an ambulatory heart rhythm monitor.  He was a bit reticent to go and get a sleep medicine evaluation.  He has that we repeat his ambulatory monitor and once again there is nonsustained VT and additionally also nonsustained atrial tachycardia.  This patient must be referred for sleep medicine evaluation.  He may try and get out of this however it is my strongest recommendation that he do so.    Subha please call him and tell him that he needs to go and have a sleep medicine evaluation.  If he does not have a preference for who he would like to see let us refer him to the [facility] team.

## 2020-10-14 NOTE — TELEPHONE ENCOUNTER
Patient called and we discussed the findings. He is already set up with Dr. Shook in [place] and he is awaiting insurance approval for the study. He had his visit with that office on 10/9/20

## 2020-12-04 ENCOUNTER — OFFICE VISIT (OUTPATIENT)
Dept: CARDIOLOGY | Facility: CLINIC | Age: 80
End: 2020-12-04

## 2020-12-04 VITALS
HEART RATE: 53 BPM | BODY MASS INDEX: 27.23 KG/M2 | HEIGHT: 70 IN | SYSTOLIC BLOOD PRESSURE: 136 MMHG | DIASTOLIC BLOOD PRESSURE: 76 MMHG | WEIGHT: 190.2 LBS

## 2020-12-04 DIAGNOSIS — I10 CHRONIC HYPERTENSION: ICD-10-CM

## 2020-12-04 DIAGNOSIS — R00.2 PALPITATIONS: ICD-10-CM

## 2020-12-04 DIAGNOSIS — I47.29 VENTRICULAR TACHYCARDIA (PAROXYSMAL) (HCC): Primary | ICD-10-CM

## 2020-12-04 DIAGNOSIS — E78.5 DYSLIPIDEMIA: ICD-10-CM

## 2020-12-04 PROCEDURE — 99214 OFFICE O/P EST MOD 30 MIN: CPT | Performed by: INTERNAL MEDICINE

## 2020-12-04 NOTE — PROGRESS NOTES
Subjective:     Encounter Date:12/04/2020    Patient ID: Casey Mathews is a 80 y.o.  white male, retired , PhD, currently retired as a  for [employer], from [place], [place].     REFERRING/INTERNIST: Ulises Emanuel MD, [facility]  CURRENT PHYSICIAN:  Yakov Jansen MD  ORTHOPEDIC SURGEON: Fidencio Montejo MD   HEMATOLOGIST:  Dae Marti MD, [facility]  PULMONOLOGIST: Bouchra Shanks MD  ELECTROPHYSIOLOGIST: Virgil Ghosh DO, [facility], [facility]  INTERVENTIONAL CARDIOLOGIST: Miller Robert MD, [facility]    Chief Complaint:   Chief Complaint   Patient presents with   • Ventricular tachycardia     f/u       Problem List:  1. Intermittent palpitations with associated tachypnea, dyspnea, weakness and fatigue:  a. Remote apparent similar symptoms with apparent “negative” stress test - data deficit, approximately 1990s.  b. Remote EKG demonstrating frequent PVCs, August 2011.  c. Acceptable echocardiogram demonstrating mild aortic valve sclerosis with mild AI and mild MR and mild right ventricular chamber enlargement, August 2011.   d. Remote abnormal acceptable echocardiogram with occasional PVCs and occasional unifocal and multifocal PVCs with occasional ventricular couplets and rare ventricular episodes with brief interventricular rhythm with acceptable Cardiolite GXT with exercise to 88% of predicted exercise capacity, LVEF (0.59), July 2014.  e. ZioXT 7/10/2020-7/24/2020: Minimum heart rate 42 bpm, maximum 255 bpm with average 68 bpm.  35 VT runs occurred with the fastest lasting 11 beats at 255 bpm and the fastest interval was also the longest.  198 SVT runs occurred, isolated SVE's were frequent 6.5%, SVE couplets, SVE triplets were rare, isolated VE's were occasional 2.9%, VE couplets were rare, VE triplets were rare, ventricular bigeminy and trigeminy were present, with recommendations for heart  catheterization  f. Residual class I symptoms on low-dose beta blocker drug therapy  g. Severe chest trauma with ventricular tachycardia noted on telemetry with subsequent abnormal event monitor demonstrating 35 episodes of monomorphic ventricular tachycardia with HR ranging up to 255 BPM with subsequent abnormal acceptable diagnostic coronary angiography and electrophysiologist consultation, September 2020 with repeat abnormal event monitor and referral for sleep MD consultation/treatment, autumn 2020  h. Residual class I symptoms, December 2020  2. Chronic hypertension - probably essential.   3. Dyslipidemia.  4. Obstructive sleep apnea, October 2020  5. Chronic lower tract obstructive symptoms - probable BPH.  6. Remote cataract extraction, 2001.  7. Remote left knee meniscal tear with arthroscopic surgery - data deficit, autumn 2015.  8. Diagnosis of chronic lymphocytic leukemia, [facility] - data deficit, 2018.  9. Recent mechanical fall with six right lung rib fractures and hemopneumothorax and abdominal bleeding with hospitalizations x2, [facility] with subsequent chest tube drainage x3 days- data deficit, July 2020 with subsequent apparent acceptable chest imaging studies -data deficit, autumn 2020    Allergies   Allergen Reactions   • Shellfish-Derived Products GI Intolerance       Current Outpatient Medications:   •  aspirin (aspirin) 81 MG EC tablet, Take 1 tablet by mouth Daily., Disp: 90 tablet, Rfl: 3  •  bisoprolol (ZEBeta) 5 MG tablet, TAKE ONE TABLET BY MOUTH DAILY, Disp: 90 tablet, Rfl: 3  •  cycloSPORINE (RESTASIS) 0.05 % ophthalmic emulsion, Administer 1 drop to both eyes Every 12 (Twelve) Hours., Disp: , Rfl:   •  dutasteride (AVODART) 0.5 MG capsule, Take 0.5 mg by mouth Every Night., Disp: , Rfl:   •  hydroCHLOROthiazide (MICROZIDE) 12.5 MG capsule, TAKE ONE CAPSULE BY MOUTH DAILY (Patient taking differently: Take 12.5 mg by mouth Every Night.), Disp: 90  "capsule, Rfl: 1  •  lisinopril (PRINIVIL,ZESTRIL) 20 MG tablet, Take 1 tablet by mouth Daily. (Patient taking differently: Take 20 mg by mouth Every Night.), Disp: 90 tablet, Rfl: 2  •  pravastatin (PRAVACHOL) 40 MG tablet, Take 1 tablet by mouth Daily. (Patient taking differently: Take 40 mg by mouth Every Night.), Disp: 90 tablet, Rfl: 3  •  tamsulosin (FLOMAX) 0.4 MG capsule 24 hr capsule, Take 1 capsule by mouth Every Night., Disp: , Rfl:     History of Present Illness: Patient returns for scheduled 4-month follow up. Since last visit, patient had an abnormal event monitor obtained with a subsequent left heart catheterization (see below). Patient states he has been doing well overall. He also saw Dr. Ghosh in October 2020 regarding nonsustained VT that was found on event monitor. Dr. Ghosh decided to repeat a Shellie heart rhythm monitor which was significant for 5 ventricular tachycardia runs with the fastest interval lasting 8 beats with a maximum rate of 279. Sleep MD evaluation was recommended. Of note, he also had a nocturnal oximetry screening study obtained by Dr. Guevara in August 2020 and was recommended to see a sleep MD. He ultimately saw Dr. Shook and underwent a formal sleep study which demonstrated moderate obstructive sleep apnea. He has not received his CPAP machine yet but is supposed to get this in 3-days. He denies daytime sleepiness. He is seeing a chest physician at [facility] every 3-months and states the fluid is \"almost out\" from his recent right hemothorax with apparent acceptable chest imagine studies. He just returned from a 2,000 mile road trip to his farm in [place] and also went to [place] to visit his daughter. He has had no interim ER visits, hospitalizations, serious illnesses, or surgeries. Patient otherwise denies chest pain, shortness of breath, PND, edema, palpitations, syncope, or presyncope at this time.       ROS   Obtained and negative except " "as outlined in problem list and HPI.    Procedures       Objective:       Vitals:    12/04/20 1247 12/04/20 1253 12/04/20 1306   BP: 153/83 151/84 136/76   BP Location: Left arm Left arm Right arm   Patient Position: Sitting Standing Sitting   Pulse: (!) 49 53    Weight: 86.3 kg (190 lb 3.2 oz)     Height: 177.8 cm (70\")       Body mass index is 27.29 kg/m².  Last weight: 183 lbs    Vitals signs reviewed.   Constitutional:       Appearance: Well-developed.   Neck:      Thyroid: No thyromegaly.      Vascular: No carotid bruit or JVD.      Lymphadenopathy: No cervical adenopathy.   Pulmonary:      Effort: Pulmonary effort is normal.      Breath sounds: Examination of the right-lower field reveals decreased breath sounds. Decreased breath sounds present. No wheezing. No rhonchi. No rales.   Cardiovascular:      Regular rhythm.      Murmurs: There is a grade 2/6 mid frequency early systolic murmur at the LLSB.      No gallop. No S3 gallop.   Pulses:     Dorsalis pedis: 2+ bilaterally.     Posterior tibial: 2+ bilaterally.  Abdominal:      Palpations: Abdomen is soft. There is no abdominal mass.      Tenderness: There is no abdominal tenderness. There is no guarding.   Musculoskeletal: Normal range of motion.   Skin:     General: Skin is warm and dry.      Findings: No rash.   Neurological:      Mental Status: Alert and oriented to person, place, and time.           Lab Review:   Lab Results   Component Value Date    GLUCOSE 115 (H) 08/12/2020    BUN 15 08/12/2020    CREATININE 0.86 08/12/2020    EGFRIFNONA 86 08/12/2020    BCR 17.4 08/12/2020     08/12/2020    K 3.8 08/12/2020     08/12/2020    CO2 27.0 08/12/2020    CALCIUM 8.7 08/12/2020       Lab Results   Component Value Date    WBC 16.37 (H) 08/12/2020    HGB 12.5 (L) 08/12/2020    HCT 40.1 08/12/2020    .6 (H) 08/12/2020     08/12/2020       Lab Results   Component Value Date    CHOL 119 08/12/2020     Lab Results   Component Value Date "    TRIG 60 08/12/2020     Lab Results   Component Value Date    HDL 52 08/12/2020     Lab Results   Component Value Date    LDL 55 08/12/2020     Event monitor, 07/24/2020 (reviewed with patient by letter- recommended [facility]):  HR ranged from 42 to 125 BPM with acceptable intervals, average HR 68/minute. Frequent atrial ectopies totaling 6.5% of total heartbeats and occasional ventricular ectopy totaling 2.9%. 190 episodes of supraventricular tachycardia with HR ranging from 82 to 187 BPM, average HR of 133 BPM. 35 episodes of monomorphic ventricular tachycardia with HR ranging up to 255 BPM with an average of 127/ minute. Fastest interval lasted 11 beats.     [facility], 08/12/2020:  IMPRESSION:  · Moderate nonobstructive disease of the LAD 50 to 60% and RCA 50-60%  · Negative IFR of the LAD at 0.93.  · LVEDP 9 mmHg, LVEF 50-60%  RECOMMENDATIONS:  · Consult electrophysiology for work-up of paroxysmal nonsustained ventricular tachycardia noted on Holter monitor.  · Start aspirin for CAD    Echocardiogram, 08/12/2020:  · Left atrial cavity size is mildly dilated.  · Estimated EF = 65%.  · Left ventricular systolic function is normal.  · Normal right ventricular cavity size, wall thickness, systolic function and septal motion noted.  · The aortic valve exhibits mild sclerosis.  · No evidence of pulmonary hypertension is present.  · There is no evidence of pericardial effusion.    Nocturnal oximetry screening study, 08/28/2020:  Defer O2, sleep MD recommended    Event monitor, 09/15/2020:   Significant for 5 ventricular tachycardia runs with the fastest interval lasting 8 beats with a maximum rate of 279. 36 supraventricular tachycardia runs.   1. AT  2. VT       Assessment:       Overall continued acceptable course with no new interim cardiopulmonary complaints with good functional status. We will defer additional diagnostic or therapeutic intervention from a cardiac perspective at this time.     Diagnosis Plan   1. Ventricular  tachycardia (paroxysmal) (CMS/HCC)  Continue current treatment.    2. Chronic hypertension  Overall acceptable control; Continue current treatment.    3. Dyslipidemia  Acceptable studies, August 2020; continue pravastatin   4. Palpitations  Acceptable control on current treatment; will defer additional evaluation and treatment to Dr. Ghosh          Plan:         1. Patient to continue current medications and close follow up with the above providers.  2. Tentative cardiology follow up in June or July 2021 or patient may return sooner PRN.    Scribed for Audie Guevara MD by Concha Manzo. 12/4/2020  13:57 EST     I, Audie Guevara MD, [facility], personally performed the services described in this documentation as scribed by the above named individual in my presence, and it is both accurate and complete. At 14:03 EST on 12/04/2020

## 2020-12-15 RX ORDER — HYDROCHLOROTHIAZIDE 12.5 MG/1
CAPSULE, GELATIN COATED ORAL
Qty: 90 CAPSULE | Refills: 0 | Status: SHIPPED | OUTPATIENT
Start: 2020-12-15 | End: 2021-03-29

## 2021-03-29 RX ORDER — HYDROCHLOROTHIAZIDE 12.5 MG/1
CAPSULE, GELATIN COATED ORAL
Qty: 90 CAPSULE | Refills: 2 | Status: SHIPPED | OUTPATIENT
Start: 2021-03-29 | End: 2022-02-16

## 2021-06-11 ENCOUNTER — OFFICE VISIT (OUTPATIENT)
Dept: CARDIOLOGY | Facility: CLINIC | Age: 81
End: 2021-06-11

## 2021-06-11 VITALS
DIASTOLIC BLOOD PRESSURE: 72 MMHG | HEART RATE: 47 BPM | BODY MASS INDEX: 27.63 KG/M2 | OXYGEN SATURATION: 96 % | SYSTOLIC BLOOD PRESSURE: 136 MMHG | WEIGHT: 193 LBS | HEIGHT: 70 IN

## 2021-06-11 DIAGNOSIS — I47.29 VENTRICULAR TACHYCARDIA (PAROXYSMAL) (HCC): Primary | ICD-10-CM

## 2021-06-11 DIAGNOSIS — I10 CHRONIC HYPERTENSION: ICD-10-CM

## 2021-06-11 DIAGNOSIS — G47.33 MILD OBSTRUCTIVE SLEEP APNEA: ICD-10-CM

## 2021-06-11 DIAGNOSIS — E78.5 DYSLIPIDEMIA: ICD-10-CM

## 2021-06-11 DIAGNOSIS — R00.2 PALPITATIONS: ICD-10-CM

## 2021-06-11 PROCEDURE — 99214 OFFICE O/P EST MOD 30 MIN: CPT | Performed by: INTERNAL MEDICINE

## 2021-06-11 NOTE — PROGRESS NOTES
Subjective:     Encounter Date:06/11/2021    Patient ID: Casey Mathews is a 80 y.o.  white male, retired , PhD, currently retired as a  for [employer], from [place], [place].     REFERRING/INTERNIST: Ulises Emanuel MD, [facility]  CURRENT PHYSICIAN:  Yakov Jansen MD  ORTHOPEDIC SURGEON: Fidencio Montejo MD   HEMATOLOGIST:  Dae Marti MD, [facility]  PULMONOLOGIST: Bouchra Shanks MD  ELECTROPHYSIOLOGIST: Virgil Ghosh DO, [facility], [facility]  INTERVENTIONAL CARDIOLOGIST: Miller Robert MD, [facility]    Chief Complaint:   Chief Complaint   Patient presents with   • Ventricular tachycardia (paroxysmal)       Problem List:  1. Intermittent palpitations with associated tachypnea, dyspnea, weakness and fatigue:  a. Remote apparent similar symptoms with apparent “negative” stress test - data deficit, approximately 1990s.  b. Remote EKG demonstrating frequent PVCs, August 2011.  c. Acceptable echocardiogram demonstrating mild aortic valve sclerosis with mild AI and mild MR and mild right ventricular chamber enlargement, August 2011.   d. Remote abnormal acceptable echocardiogram with occasional PVCs and occasional unifocal and multifocal PVCs with occasional ventricular couplets and rare ventricular episodes with brief interventricular rhythm with acceptable Cardiolite GXT with exercise to 88% of predicted exercise capacity, LVEF (0.59), July 2014.  e. ZioXT 7/10/2020-7/24/2020: Minimum heart rate 42 bpm, maximum 255 bpm with average 68 bpm.  35 VT runs occurred with the fastest lasting 11 beats at 255 bpm and the fastest interval was also the longest.  198 SVT runs occurred, isolated SVE's were frequent 6.5%, SVE couplets, SVE triplets were rare, isolated VE's were occasional 2.9%, VE couplets were rare, VE triplets were rare, ventricular bigeminy and trigeminy were present, with recommendations for heart  catheterization  f. Residual class I symptoms on low-dose beta blocker drug therapy  g. Severe chest trauma with ventricular tachycardia noted on telemetry with subsequent abnormal event monitor demonstrating 35 episodes of monomorphic ventricular tachycardia with HR ranging up to 255 BPM with subsequent abnormal acceptable diagnostic coronary angiography and electrophysiologist consultation, September 2020 with repeat abnormal event monitor and referral for sleep MD consultation/treatment, autumn 2020  h. Residual class I symptoms, December 2020, June 2021  2. Chronic hypertension - probably essential.   3. Dyslipidemia.  4. Obstructive sleep apnea, October 2020 with CPAP therapy; data deficit.  5. Chronic lower tract obstructive symptoms - probable BPH.  6. Remote cataract extraction, 2001.  7. Remote left knee meniscal tear with arthroscopic surgery - data deficit, autumn 2015.  8. Diagnosis of chronic lymphocytic leukemia, [facility] - data deficit, 2018.  9. Recent mechanical fall with six right lung rib fractures and hemopneumothorax and abdominal bleeding with hospitalizations x2, [facility] with subsequent chest tube drainage x3 days- data deficit, July 2020 with subsequent apparent acceptable chest imaging studies -data deficit, autumn 2020    Allergies   Allergen Reactions   • Shellfish-Derived Products GI Intolerance       Current Outpatient Medications:   •  aspirin (aspirin) 81 MG EC tablet, Take 1 tablet by mouth Daily., Disp: 90 tablet, Rfl: 3  •  bisoprolol (ZEBeta) 5 MG tablet, TAKE ONE TABLET BY MOUTH DAILY, Disp: 90 tablet, Rfl: 3  •  cycloSPORINE (RESTASIS) 0.05 % ophthalmic emulsion, Administer 1 drop to both eyes Every 12 (Twelve) Hours., Disp: , Rfl:   •  dutasteride (AVODART) 0.5 MG capsule, Take 0.5 mg by mouth Every Night., Disp: , Rfl:   •  hydroCHLOROthiazide (MICROZIDE) 12.5 MG capsule, TAKE ONE CAPSULE BY MOUTH DAILY, Disp: 90 capsule, Rfl: 2  •   "lisinopril (PRINIVIL,ZESTRIL) 20 MG tablet, Take 1 tablet by mouth Daily. (Patient taking differently: Take 20 mg by mouth Every Night.), Disp: 90 tablet, Rfl: 2  •  pravastatin (PRAVACHOL) 40 MG tablet, Take 1 tablet by mouth Daily. (Patient taking differently: Take 40 mg by mouth Every Night.), Disp: 90 tablet, Rfl: 3  •  tamsulosin (FLOMAX) 0.4 MG capsule 24 hr capsule, Take 1 capsule by mouth Every Night., Disp: , Rfl:     History of Present Illness: Patient returns for scheduled 6-month follow up. He has been feeling well overall from a cardiovascular standpoint. Patient denies chest pain, shortness of breath, palpitations, edema, dizziness, and syncope. He has had no interim ER visits, hospitalizations, serious illnesses, or surgeries. He reports in October 2020 he started using CPAP for sleep apnea. He has not experienced any palpitation since then. He attributes his elevated blood pressure to driving to the hospital in the rain and traffic. He has a blood pressure cuff at home but does not regularly check it. He walks 2 miles daily and rides his stationary bike daily. He has received COVID immunizations.    ROS   Obtained and negative except as outlined in problem list and HPI.    Procedures       Objective:       Vitals:    06/11/21 1026 06/11/21 1027 06/11/21 1038   BP: 165/91 138/77 136/72   BP Location: Left arm Left arm Left arm   Patient Position: Sitting Standing Sitting   Pulse: (!) 47 (!) 47    SpO2: 96%     Weight: 87.5 kg (193 lb)     Height: 177.8 cm (70\")       Body mass index is 27.69 kg/m².  Last weight: 190 lbs    Vitals reviewed.   Constitutional:       Appearance: Well-developed.   Neck:      Thyroid: No thyromegaly.      Vascular: No carotid bruit or JVD.      Lymphadenopathy: No cervical adenopathy.   Pulmonary:      Effort: Pulmonary effort is normal.      Breath sounds: Normal breath sounds. No wheezing. No rhonchi. No rales.   Cardiovascular:      Regular rhythm.      Murmurs: There is " a grade 1/6 midsystolic murmur at the URSB, radiating to the neck.      No gallop. No S3 gallop.   Pulses:     Dorsalis pedis: 2+ bilaterally.     Posterior tibial: 2+ bilaterally.  Edema:     Peripheral edema absent.   Abdominal:      Palpations: Abdomen is soft. There is no abdominal mass.      Tenderness: There is no abdominal tenderness. There is no guarding.   Musculoskeletal: Normal range of motion. Skin:     General: Skin is warm and dry.      Findings: No rash.   Neurological:      Mental Status: Alert and oriented to person, place, and time.           Lab Review:     No recent laboratory studies available for review today.        Assessment:       Overall continued acceptable course with no new interim cardiopulmonary complaints with good functional status. We will defer additional diagnostic or therapeutic intervention from a cardiac perspective at this time. Hopefully we will be able to obtain any upcoming laboratory study results for review with the patient by letter.     Diagnosis Plan   1. Ventricular tachycardia (paroxysmal) (CMS/HCC)  Stable and asymptomatic. Improved with CPAP therapy. Continue current treatment.   2. Palpitations  Stable and asymptomatic. Improved with CPAP therapy. Continue current treatment.   3. Chronic hypertension  Fair control. Continue current treatment.   4. Dyslipidemia  No new data to review. Continue pravastatin.   5. Mild obstructive sleep apnea  Compliance stressed. Very well tolerated.          Plan:         1. Patient to continue current medications and close follow up with the above providers.  2. Encouraged to monitor blood pressure regularly, 1-2 times per week.  3. Tentative cardiology follow up in March/April 2022 or patient may return sooner PRN.    Raúl KAUR, attest that this documentation has been prepared under the direction and in the presence of Audie Guevara MD 06/11/2021    Audie KAUR MD, [facility], personally performed the services  described in this documentation as scribed by the above named individual in my presence, and it is both accurate and complete. At 11:06 EDT on 06/11/2021

## 2021-06-14 RX ORDER — BISOPROLOL FUMARATE 5 MG/1
TABLET, FILM COATED ORAL
Qty: 90 TABLET | Refills: 2 | Status: SHIPPED | OUTPATIENT
Start: 2021-06-14 | End: 2022-05-17

## 2022-02-16 RX ORDER — HYDROCHLOROTHIAZIDE 12.5 MG/1
CAPSULE, GELATIN COATED ORAL
Qty: 90 CAPSULE | Refills: 3 | Status: SHIPPED | OUTPATIENT
Start: 2022-02-16

## 2022-03-10 NOTE — PROGRESS NOTES
Subjective:     Encounter Date:03/11/2022    Patient ID: Casey Mathews is a 81 y.o.  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     REFERRING/INTERNIST: Ulises Emanuel MD, [facility]  CURRENT PHYSICIAN:  Yakov Jansen MD  ORTHOPEDIC SURGEON: Fidencio Montejo MD   HEMATOLOGIST:  Dae Marti MD, [facility] [place]  PULMONOLOGIST: Bouchra Shanks MD  ELECTROPHYSIOLOGIST: Virgil Ghosh DO, [facility], [facility]  INTERVENTIONAL CARDIOLOGIST: Miller Robert MD, [facility]    Chief Complaint:   Chief Complaint   Patient presents with   • Ventricular tachycardia (paroxysmal) (CMS/HCC)     Follow up       Problem List:  1. Intermittent palpitations with associated tachypnea, dyspnea, weakness and fatigue:  a. Remote apparent similar symptoms with apparent “negative” stress test - data deficit, approximately 1990s.  b. Remote EKG demonstrating frequent PVCs, August 2011.  c. Acceptable echocardiogram demonstrating mild aortic valve sclerosis with mild AI and mild MR and mild right ventricular chamber enlargement, August 2011.   d. Remote abnormal acceptable echocardiogram with occasional PVCs and occasional unifocal and multifocal PVCs with occasional ventricular couplets and rare ventricular episodes with brief interventricular rhythm with acceptable Cardiolite GXT with exercise to 88% of predicted exercise capacity, LVEF (0.59), July 2014.  e. ZioXT 7/10/2020-7/24/2020: Minimum heart rate 42 bpm, maximum 255 bpm with average 68 bpm.  35 VT runs occurred with the fastest lasting 11 beats at 255 bpm and the fastest interval was also the longest.  198 SVT runs occurred, isolated SVE's were frequent 6.5%, SVE couplets, SVE triplets were rare, isolated VE's were occasional 2.9%, VE couplets were rare, VE triplets were rare, ventricular bigeminy and trigeminy were present,  with recommendations for heart catheterization  f. Residual class I symptoms on low-dose beta blocker drug therapy  g. Severe chest trauma with ventricular tachycardia noted on telemetry with subsequent abnormal event monitor demonstrating 35 episodes of monomorphic ventricular tachycardia with HR ranging up to 255 BPM with subsequent abnormal acceptable diagnostic coronary angiography and electrophysiologist consultation, September 2020 with repeat abnormal event monitor and referral for sleep MD consultation/treatment, autumn 2020  h. Residual class I symptoms, December 2020, June 2021, March 2022  2. Chronic hypertension - probably essential.   3. Dyslipidemia.  4. Obstructive sleep apnea, October 2020 with CPAP therapy; data deficit.  5. Chronic lower tract obstructive symptoms - probable BPH.  6. Remote cataract extraction, 2001.  7. Remote left knee meniscal tear with arthroscopic surgery - data deficit, autumn 2015.  8. Diagnosis of chronic lymphocytic leukemia, [facility] - data deficit, 2018.  9. Remote mechanical fall with six right lung rib fractures and hemopneumothorax and abdominal bleeding with hospitalizations x2, [facility] with subsequent chest tube drainage x3 days- data deficit, July 2020 with subsequent apparent acceptable chest imaging studies -data deficit, autumn 2020    Allergies   Allergen Reactions   • Shellfish-Derived Products GI Intolerance       Current Outpatient Medications:   •  bisoprolol (ZEBeta) 5 MG tablet, TAKE ONE TABLET BY MOUTH DAILY, Disp: 90 tablet, Rfl: 2  •  dutasteride (AVODART) 0.5 MG capsule, Take 0.5 mg by mouth Every Night., Disp: , Rfl:   •  hydroCHLOROthiazide (MICROZIDE) 12.5 MG capsule, TAKE ONE CAPSULE BY MOUTH DAILY, Disp: 90 capsule, Rfl: 3  •  lisinopril (PRINIVIL,ZESTRIL) 20 MG tablet, Take 1 tablet by mouth Daily. (Patient taking differently: Take 20 mg by mouth Every Night.), Disp: 90 tablet, Rfl: 2  •  pravastatin  "(PRAVACHOL) 40 MG tablet, Take 1 tablet by mouth Daily. (Patient taking differently: Take 40 mg by mouth Every Night.), Disp: 90 tablet, Rfl: 3  •  tamsulosin (FLOMAX) 0.4 MG capsule 24 hr capsule, Take 1 capsule by mouth Every Night., Disp: , Rfl:     History of Present Illness: Patient returns for scheduled 9-month follow up. He has been feeling well overall from a cardiovascular standpoint. Patient denies chest pain, shortness of breath, palpitations, edema, dizziness, and syncope. He is active and asymptomatic on daily basis. He walks 2 miles daily and rides a stationary bike 3 times weekly. His right knee will become painful when he is walking. He has scheduled follow up with orthopedic surgery in the near future. He has had no interim ER visits, hospitalizations, serious illnesses, or surgeries. He reports he platelets have increased to 127 on his most recent laboratory studies. He will see Dr. Jansen in the near future and anticipates having laboratory studies collected. He does have monthly CBC with differential performed; data deficit. He monitors his blood pressure at home and reports it is usually around 140/90. He states he is very compliant with CPAP and feels this has been of great benefit.      ROS   Obtained and negative except as outlined in problem list and HPI.    Procedures       Objective:       Vitals:    03/11/22 0957 03/11/22 0958 03/11/22 1015   BP: 164/87 168/90 142/78   BP Location: Left arm Left arm Right arm   Patient Position: Sitting Standing Sitting   Pulse: (!) 48 51    SpO2: 97%     Weight: 89.4 kg (197 lb)     Height: 177.8 cm (70\")       Body mass index is 28.27 kg/m².  Last weight: 193 lbs    Vitals reviewed.   Constitutional:       Appearance: Well-developed.   Neck:      Thyroid: No thyromegaly.      Vascular: No carotid bruit or JVD.      Lymphadenopathy: No cervical adenopathy.   Pulmonary:      Effort: Pulmonary effort is normal.      Breath sounds: Normal breath sounds. No " wheezing. No rhonchi. No rales.   Cardiovascular:      Regular rhythm.      Murmurs: There is a grade 1/6 mid frequency midsystolic murmur at the URSB.      No gallop. No S3 gallop.   Pulses:     Dorsalis pedis: 1+ bilaterally.     Posterior tibial: 1+ bilaterally.  Edema:     Peripheral edema absent.   Abdominal:      Palpations: Abdomen is soft. There is no abdominal mass.      Tenderness: There is no abdominal tenderness.   Musculoskeletal: Normal range of motion. Skin:     General: Skin is warm and dry.      Findings: No rash.   Neurological:      Mental Status: Alert and oriented to person, place, and time.           Lab Review:     No recent laboratory studies available for review today.        Assessment:       Overall continued acceptable course with no new interim cardiopulmonary complaints with good functional status. We will defer additional diagnostic or therapeutic intervention from a cardiac perspective at this time. Hopefully we will be able to obtain any upcoming laboratory study results for review with the patient by letter. He should monitor his blood pressure at home and inform us if his systolic blood pressure is consistently greater than 140 mmHg systolic.     Diagnosis Plan   1. Ventricular tachycardia (paroxysmal) (HCC)  Largely asymptomatic. Continue current treatment.   2. Palpitations  Largely asymptomatic. Continue current treatment.   3. Chronic hypertension  Fair control. Continue current treatment and inform us if systolic blood pressure is consistently greater than 140 torr.   4. Dyslipidemia  No data to review. Continue pravastatin.   5. Mild obstructive sleep apnea  Compliance stressed.          Plan:         1. Patient to continue current medications and close follow up with the above providers.  2. Tentative cardiology follow up in October 2022 or patient may return sooner PRN.  3. 1 800 card issued.    NATASHA, Raúl Paul, attest that this documentation has been prepared under the  direction and in the presence of Audie Guevara MD 03/11/2022    I, Audie Guevara MD, [facility], personally performed the services described in this documentation as scribed by the above named individual in my presence, and it is both accurate and complete. At 10:37 EST on 03/11/2022

## 2022-03-11 ENCOUNTER — OFFICE VISIT (OUTPATIENT)
Dept: CARDIOLOGY | Facility: CLINIC | Age: 82
End: 2022-03-11

## 2022-03-11 VITALS
DIASTOLIC BLOOD PRESSURE: 78 MMHG | BODY MASS INDEX: 28.2 KG/M2 | HEIGHT: 70 IN | SYSTOLIC BLOOD PRESSURE: 142 MMHG | WEIGHT: 197 LBS | HEART RATE: 51 BPM | OXYGEN SATURATION: 97 %

## 2022-03-11 DIAGNOSIS — E78.5 DYSLIPIDEMIA: ICD-10-CM

## 2022-03-11 DIAGNOSIS — I10 CHRONIC HYPERTENSION: ICD-10-CM

## 2022-03-11 DIAGNOSIS — R00.2 PALPITATIONS: ICD-10-CM

## 2022-03-11 DIAGNOSIS — I47.29 VENTRICULAR TACHYCARDIA (PAROXYSMAL): Primary | ICD-10-CM

## 2022-03-11 DIAGNOSIS — G47.33 MILD OBSTRUCTIVE SLEEP APNEA: ICD-10-CM

## 2022-03-11 PROCEDURE — 99214 OFFICE O/P EST MOD 30 MIN: CPT | Performed by: INTERNAL MEDICINE

## 2022-05-17 RX ORDER — BISOPROLOL FUMARATE 5 MG/1
TABLET, FILM COATED ORAL
Qty: 90 TABLET | Refills: 3 | Status: SHIPPED | OUTPATIENT
Start: 2022-05-17

## 2022-06-17 ENCOUNTER — TELEPHONE (OUTPATIENT)
Dept: CARDIOLOGY | Facility: CLINIC | Age: 82
End: 2022-06-17

## 2022-06-17 NOTE — TELEPHONE ENCOUNTER
Noted; should be okay if this is the only isolated time his heart rate gets low.  He may have component of obstructive sleep apnea that needs evaluation and treatment.  If his heart rate is staying above 50 bpm during the day I would not make any further adjustments in his treatment at this time.    Thanks!

## 2022-06-17 NOTE — TELEPHONE ENCOUNTER
Patient is concerned due to HR <40 for about 10 minutes between 2:00-3:00. Patient is newly aware of this due to new watch that monitor's heart rate. Pt reports no symptoms when he wakes up due to watch notification.     BP running 130s/70s    Current cardiac meds:  Bisoprolol 5 mg daily AM  HCTZ 12.5 mg daily AM  Valsartan 160 mg daily PM        Patient wanted to make sure this was ok.     Please advise.

## 2022-10-26 ENCOUNTER — OFFICE VISIT (OUTPATIENT)
Dept: CARDIOLOGY | Facility: CLINIC | Age: 82
End: 2022-10-26

## 2022-10-26 VITALS
SYSTOLIC BLOOD PRESSURE: 163 MMHG | HEART RATE: 63 BPM | OXYGEN SATURATION: 99 % | HEIGHT: 70 IN | WEIGHT: 190.4 LBS | BODY MASS INDEX: 27.26 KG/M2 | DIASTOLIC BLOOD PRESSURE: 99 MMHG

## 2022-10-26 DIAGNOSIS — E78.5 DYSLIPIDEMIA: ICD-10-CM

## 2022-10-26 DIAGNOSIS — I10 CHRONIC HYPERTENSION: ICD-10-CM

## 2022-10-26 DIAGNOSIS — R00.2 PALPITATIONS: Primary | ICD-10-CM

## 2022-10-26 PROCEDURE — 99214 OFFICE O/P EST MOD 30 MIN: CPT | Performed by: NURSE PRACTITIONER

## 2022-10-26 PROCEDURE — 93000 ELECTROCARDIOGRAM COMPLETE: CPT | Performed by: NURSE PRACTITIONER

## 2022-10-26 RX ORDER — VALSARTAN 160 MG/1
160 TABLET ORAL DAILY
COMMUNITY
Start: 2022-10-03

## 2022-10-26 RX ORDER — LISINOPRIL 2.5 MG/1
2.5 TABLET ORAL DAILY
COMMUNITY
End: 2022-10-26

## 2023-02-10 ENCOUNTER — TELEPHONE (OUTPATIENT)
Dept: CARDIOLOGY | Facility: CLINIC | Age: 83
End: 2023-02-10
Payer: MEDICARE

## 2023-02-10 NOTE — TELEPHONE ENCOUNTER
Patient called for cardiac clearance for right knee replacement with Dr. Montejo on 3/29/23.    Pt's LOV 10/26/2022    What is patient's cardiac risk?    Please advise.         Attn: Reina  [phone]

## 2023-02-10 NOTE — TELEPHONE ENCOUNTER
Called pt, no answer, LVM stating that I would fax letter to Dr. Montejo's office.     Letter faxed.

## 2023-04-13 RX ORDER — HYDROCHLOROTHIAZIDE 12.5 MG/1
12.5 CAPSULE, GELATIN COATED ORAL DAILY
Qty: 90 CAPSULE | Refills: 0 | Status: SHIPPED | OUTPATIENT
Start: 2023-04-13

## 2023-08-17 RX ORDER — BISOPROLOL FUMARATE 5 MG/1
TABLET, FILM COATED ORAL
Qty: 90 TABLET | Refills: 0 | Status: SHIPPED | OUTPATIENT
Start: 2023-08-17

## 2023-10-16 RX ORDER — HYDROCHLOROTHIAZIDE 12.5 MG/1
12.5 CAPSULE, GELATIN COATED ORAL DAILY
Qty: 90 CAPSULE | Refills: 0 | Status: SHIPPED | OUTPATIENT
Start: 2023-10-16

## 2024-01-14 RX ORDER — HYDROCHLOROTHIAZIDE 12.5 MG/1
12.5 CAPSULE, GELATIN COATED ORAL DAILY
Qty: 90 CAPSULE | Refills: 1 | Status: SHIPPED | OUTPATIENT
Start: 2024-01-14

## 2024-03-11 RX ORDER — BISOPROLOL FUMARATE 5 MG/1
TABLET, FILM COATED ORAL
Qty: 90 TABLET | Refills: 0 | Status: SHIPPED | OUTPATIENT
Start: 2024-03-11

## 2024-07-15 RX ORDER — HYDROCHLOROTHIAZIDE 12.5 MG/1
12.5 CAPSULE, GELATIN COATED ORAL DAILY
Qty: 90 CAPSULE | Refills: 1 | Status: SHIPPED | OUTPATIENT
Start: 2024-07-15

## 2024-09-09 ENCOUNTER — OFFICE VISIT (OUTPATIENT)
Dept: CARDIOLOGY | Facility: CLINIC | Age: 84
End: 2024-09-09
Payer: MEDICARE

## 2024-09-09 VITALS
DIASTOLIC BLOOD PRESSURE: 80 MMHG | BODY MASS INDEX: 27.94 KG/M2 | HEART RATE: 49 BPM | OXYGEN SATURATION: 96 % | WEIGHT: 195.2 LBS | SYSTOLIC BLOOD PRESSURE: 140 MMHG | HEIGHT: 70 IN

## 2024-09-09 DIAGNOSIS — E78.2 MIXED HYPERLIPIDEMIA: ICD-10-CM

## 2024-09-09 DIAGNOSIS — R00.2 PALPITATIONS: Primary | ICD-10-CM

## 2024-09-09 DIAGNOSIS — I10 PRIMARY HYPERTENSION: ICD-10-CM

## 2024-09-09 PROCEDURE — 99214 OFFICE O/P EST MOD 30 MIN: CPT | Performed by: INTERNAL MEDICINE

## 2024-09-09 PROCEDURE — 3077F SYST BP >= 140 MM HG: CPT | Performed by: INTERNAL MEDICINE

## 2024-09-09 PROCEDURE — 3079F DIAST BP 80-89 MM HG: CPT | Performed by: INTERNAL MEDICINE

## 2024-09-09 NOTE — PROGRESS NOTES
Subjective:     Encounter Date:9/9/2024        Patient ID: Casey Mathews is a 84 y.o.  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     REFERRING/INTERNIST: Ulises Emanuel MD, [facility]  CURRENT PHYSICIAN:  Yakov Jansen MD  ORTHOPEDIC SURGEON: Fidencio Montejo MD   HEMATOLOGIST:  Dae Marti MD, [facility]  PULMONOLOGIST: Bouchra Shanks MD  ELECTROPHYSIOLOGIST: Virgil Ghosh DO, [facility], [facility]  INTERVENTIONAL CARDIOLOGIST: Miller Robert MD, [facility] .    Chief Complaint:   Chief Complaint   Patient presents with    Palpitations     Problem List:  PVC    ZioXT 7/10/2020-7/24/2020: Minimum heart rate 42 bpm, maximum 255 bpm with average 68 bpm.  35 VT runs occurred with the fastest lasting 11 beats at 255 bpm and the fastest interval was also the longest.  198 SVT runs occurred, isolated SVE's were frequent 6.5%, SVE couplets, SVE triplets were rare, isolated VE's were occasional 2.9%, VE couplets were rare, VE triplets were rare, ventricular bigeminy and trigeminy were present, with recommendations for heart catheterization  8/20 echo EF 65% AV sclerosis  2020 Severe chest trauma with ventricular tachycardia noted on telemetry     CAD    8/20 [facility] IFR - LAD EF >60% per RDS  Chronic hypertension - probably essential.   Dyslipidemia.  5/23 129/125/48/56  Obstructive sleep apnea, October 2020 with CPAP therapy  Chronic lower tract obstructive symptoms - probable BPH.  Remote cataract extraction, 2001.  Remote left knee meniscal tear with arthroscopic surgery - data deficit, autumn 2015.  Diagnosis of chronic lymphocytic leukemia, [facility] - data deficit, 2018.  Remote mechanical fall with six right lung rib fractures and hemopneumothorax and abdominal bleeding with hospitalizations x2, [facility] with subsequent  "chest tube drainage x3 days- data deficit, July 2020 with subsequent apparent acceptable chest imaging studies -data deficit, autumn 2020  R TKR 2/23 Dr Hawkins    Allergies   Allergen Reactions    Shellfish-Derived Products GI Intolerance       Current Outpatient Medications   Medication Instructions    amitriptyline (ELAVIL) 10 mg, Oral, Nightly PRN    bisoprolol (ZEBeta) 5 MG tablet Take 1 tablet by mouth once daily    dutasteride (AVODART) 0.5 mg, Oral, Nightly    hydroCHLOROthiazide (MICROZIDE) 12.5 mg, Oral, Daily    pravastatin (PRAVACHOL) 40 mg, Oral, Daily    tamsulosin (FLOMAX) 0.4 MG capsule 24 hr capsule 1 capsule, Oral, Nightly    valsartan (DIOVAN) 160 mg, Oral, Daily         HISTORY OF PRESENT ILLNESS:  The patient is here for  follow up.  Feels well exercises 5 out of 7 days without any exercise intolerance.  Been having some sinus headaches of late.  Brings his blood pressure diary with him that is largely controlled  Was noted recently with elevated blood sugar A1c at 6.7 which was new for him.  States that he had vacation to his [facility] and [place] prior to his A1c and did not stay on his dietary regimen         Objective:       Vitals:    09/09/24 1037   BP: 140/80   BP Location: Right arm   Patient Position: Sitting   Cuff Size: Adult   Pulse: (!) 49   SpO2: 96%   Weight: 88.5 kg (195 lb 3.2 oz)   Height: 177.8 cm (70\")       Body mass index is 28.01 kg/m².    Constitutional:       Appearance: Healthy appearance. Not in distress.   Neck:      Vascular: No JVR. JVD normal.   Pulmonary:      Effort: Pulmonary effort is normal.      Breath sounds: Normal breath sounds. No wheezing. No rhonchi. No rales.   Chest:      Chest wall: Not tender to palpatation.   Cardiovascular:      PMI at left midclavicular line. Normal rate. Regular rhythm. Normal S1. Normal S2.       Murmurs: There is a grade 1/6 systolic murmur at the URSB.      No gallop.  No click. No rub.   Pulses:     Dorsalis pedis: 1+ " bilaterally.     Posterior tibial: 1+ bilaterally.  Edema:     Peripheral edema absent.   Abdominal:      General: Bowel sounds are normal.      Palpations: Abdomen is soft.      Tenderness: There is no abdominal tenderness.   Musculoskeletal: Normal range of motion.         General: No tenderness. Skin:     General: Skin is warm and dry.   Neurological:      General: No focal deficit present.      Mental Status: Alert and oriented to person, place and time.           Lab Review:   Lab Results   Component Value Date    GLUCOSE 115 (H) 08/12/2020    BUN 15 08/12/2020    CREATININE 0.86 08/12/2020    EGFRIFNONA 86 08/12/2020    BCR 17.4 08/12/2020    CO2 27.0 08/12/2020    CALCIUM 8.7 08/12/2020       Lab Results   Component Value Date    WBC 16.37 (H) 08/12/2020    HGB 12.5 (L) 08/12/2020    HCT 40.1 08/12/2020    .6 (H) 08/12/2020     08/12/2020         Lab Results   Component Value Date    CHOL 119 08/12/2020     Lab Results   Component Value Date    TRIG 60 08/12/2020     Lab Results   Component Value Date    HDL 52 08/12/2020     Lab Results   Component Value Date    LDL 55 08/12/2020           Lab Results   Component Value Date    BNP 67 06/24/2014     Advance Care Planning                Assessment:            Diagnosis Plan   1. Palpitations   Stable, continue bisoprolol      2. Chronic hypertension   Controlled, continue current cardiac medications      3. Dyslipidemia   No new labs to review, continue pravastatin             Plan:         CAD with no angina continue GDMT  Hypertension controlled tamsulosin bisoprolol HCTZ valsartan  Mixed dyslipidemia with LDL at target on statin therapy  Insulin resistance prediabetes to follow-up with PCP in a.m. discussed with him dietary management

## 2024-09-16 RX ORDER — BISOPROLOL FUMARATE 5 MG/1
TABLET, FILM COATED ORAL
Qty: 90 TABLET | Refills: 2 | Status: SHIPPED | OUTPATIENT
Start: 2024-09-16

## 2025-04-18 RX ORDER — HYDROCHLOROTHIAZIDE 12.5 MG/1
12.5 CAPSULE ORAL DAILY
Qty: 90 CAPSULE | Refills: 1 | Status: SHIPPED | OUTPATIENT
Start: 2025-04-18

## 2025-04-18 NOTE — TELEPHONE ENCOUNTER
COMPREHENSIVE METABOLIC PANEL  Order: [id number]  Component  Ref Range & Units 1 yr ago   Sodium  136 - 145 mmol/L 143   Potassium  3.3 - 4.8 mmol/L 3.4   Comment: Plasma reference ranges are shown, please note that serum reference ranges are higher than plasma.   Chloride  98 - 107 mmol/L 106   Total CO2  23 - 31 mmol/L 28   Anion Gap 9   Glucose  70 - 99 mg/dL 124 High    BUN  8 - 26 mg/dL 24   Creatinine  0.72 - 1.25 mg/dL 1.03   Calcium  8.4 - 10.5 mg/dL 9.6   Total Bilirubin  0.2 - 1.2 mg/dL 0.9   Comment: Patients receiving indocyanine green for procedures will show falsely elevated total and direct bilirubin results until the drug is cleared.   Albumin Level  3.2 - 4.6 gm/dL 4.4   Total Protein  6.0 - 8.3 gm/dL 7.0   Comment: Plasma samples are 0.3 - 0.5 g/dL higher than serum   Alkaline Phosphatase  40 - 150 unit/L 80   AST (SGOT)  5 - 40 unit/L 15   ALT (SGPT)  0 - 55 unit/L 11

## 2025-05-22 ENCOUNTER — TELEPHONE (OUTPATIENT)
Dept: CARDIOLOGY | Facility: CLINIC | Age: 85
End: 2025-05-22

## 2025-05-22 NOTE — TELEPHONE ENCOUNTER
"  Caller: Casey Mathews \"Parrish\"    Relationship to patient: Self    Best call back number: [phone]    Chief complaint:     Type of visit: FOLLOW UP    Requested date: AUGUST-SEPTEMBER     If rescheduling, when is the original appointment: 12.17.2025     Additional notes:PATIENT IS REQUESTING TO BE SEEN AROUND AUG-SEPT. IT WILL BE 15 MONTHS TO SEE DR. NELSON IF HE WAITS FOR THE 12.17.2025 APPOINTMENT. HE ALSO WANTS THAT TO BE RES FOR THE [place] OFFICE. PLEASE CALL THE PATIENT TO DISCUSS.         "

## 2025-06-26 ENCOUNTER — TELEPHONE (OUTPATIENT)
Dept: CARDIOLOGY | Facility: CLINIC | Age: 85
End: 2025-06-26
Payer: MEDICARE

## 2025-06-26 NOTE — TELEPHONE ENCOUNTER
"  Caller: Casey Mathews \"Parrish\"    Relationship to patient: Self    Best call back number: [phone]    Chief complaint:     Type of visit: FOLLOW UP    Requested date: OCTOBER, NOVEMBER     If rescheduling, when is the original appointment: 12.17.2025     Additional notes:PATIENT WANTS TO SEE DR. NELSON IN [facility] AND HE WOULD LIKE TO BE SEEN IN OCTOBER, NOVEMBER. PLEASE CALL HIM TO RES.          "

## (undated) DEVICE — GUIDE CATHETER: Brand: MACH1™

## (undated) DEVICE — PINNACLE R/O II INTRODUCER SHEATH WITH RADIOPAQUE MARKER: Brand: PINNACLE

## (undated) DEVICE — ANGIO-SEAL VIP VASCULAR CLOSURE DEVICE: Brand: ANGIO-SEAL

## (undated) DEVICE — CATH DIAG EXPO M/ PK 5F FL4/FR4 PIG

## (undated) DEVICE — KT VLV HEMO MAP ACC PLS LG/BORE MTL/INTRO W/TORQ/DEV

## (undated) DEVICE — GW J TP FIX CORE .035 150

## (undated) DEVICE — GLIDESHEATH SLENDER STAINLESS STEEL KIT: Brand: GLIDESHEATH SLENDER

## (undated) DEVICE — PK CATH CARD 10

## (undated) DEVICE — PINNACLE INTRODUCER SHEATH: Brand: PINNACLE

## (undated) DEVICE — MODEL BT2000 P/N 700287-012KIT CONTENTS: MANIFOLD WITH SALINE AND CONTRAST PORTS, SALINE TUBING WITH SPIKE AND HAND SYRINGE, TRANSDUCER: Brand: BT2000 AUTOMATED MANIFOLD KIT

## (undated) DEVICE — SKIN PREP TRAY W/CHG: Brand: MEDLINE INDUSTRIES, INC.

## (undated) DEVICE — ST ACC MICROPUNCTURE .018 TRANSLSS/SS/TP 5F/10CM 21G/7CM

## (undated) DEVICE — GW PERIPH GUIDERIGHT STD/EXCHNG/J/TIP SS 0.035IN 5X260CM

## (undated) DEVICE — CATH DIAG EXPO .045 FL3.5 5F 100CM

## (undated) DEVICE — Device: Brand: OMNIWIRE PRESSURE GUIDE WIRE

## (undated) DEVICE — MODEL AT P65, P/N 701554-001KIT CONTENTS: HAND CONTROLLER, 3-WAY HIGH-PRESSURE STOPCOCK WITH ROTATING END AND PREMIUM HIGH-PRESSURE TUBING: Brand: ANGIOTOUCH® KIT

## (undated) DEVICE — DEV COMP RAD PRELUDESYNC 24CM